# Patient Record
Sex: FEMALE | Race: OTHER
[De-identification: names, ages, dates, MRNs, and addresses within clinical notes are randomized per-mention and may not be internally consistent; named-entity substitution may affect disease eponyms.]

---

## 2018-03-22 ENCOUNTER — HOSPITAL ENCOUNTER (OUTPATIENT)
Dept: HOSPITAL 8 - RAD | Age: 23
Discharge: HOME | End: 2018-03-22
Attending: NURSE PRACTITIONER
Payer: MEDICAID

## 2018-03-22 DIAGNOSIS — R10.2: Primary | ICD-10-CM

## 2018-03-22 PROCEDURE — 76830 TRANSVAGINAL US NON-OB: CPT

## 2018-03-25 ENCOUNTER — HOSPITAL ENCOUNTER (EMERGENCY)
Dept: HOSPITAL 8 - ED | Age: 23
Discharge: HOME | End: 2018-03-25
Payer: MEDICAID

## 2018-03-25 VITALS — BODY MASS INDEX: 38.61 KG/M2 | WEIGHT: 196.65 LBS | HEIGHT: 60 IN

## 2018-03-25 VITALS — SYSTOLIC BLOOD PRESSURE: 106 MMHG | DIASTOLIC BLOOD PRESSURE: 58 MMHG

## 2018-03-25 DIAGNOSIS — N73.0: Primary | ICD-10-CM

## 2018-03-25 LAB
ALBUMIN SERPL-MCNC: 4 G/DL (ref 3.4–5)
ALP SERPL-CCNC: 86 U/L (ref 45–117)
ALT SERPL-CCNC: 39 U/L (ref 12–78)
ANION GAP SERPL CALC-SCNC: 5 MMOL/L (ref 5–15)
BASOPHILS # BLD AUTO: 0.04 X10^3/UL (ref 0–0.3)
BASOPHILS NFR BLD AUTO: 0 % (ref 0–1)
BILIRUB SERPL-MCNC: 0.3 MG/DL (ref 0.2–1)
CALCIUM SERPL-MCNC: 8.9 MG/DL (ref 8.5–10.1)
CHLORIDE SERPL-SCNC: 106 MMOL/L (ref 98–107)
CLUE CELLS: (no result)
CREAT SERPL-MCNC: 0.63 MG/DL (ref 0.55–1.02)
CULTURE INDICATED?: NO
EOSINOPHIL # BLD AUTO: 0.15 X10^3/UL (ref 0–0.8)
EOSINOPHIL NFR BLD AUTO: 2 % (ref 1–7)
ERYTHROCYTE [DISTWIDTH] IN BLOOD BY AUTOMATED COUNT: 12.6 % (ref 9.6–15.2)
LYMPHOCYTES # BLD AUTO: 2.45 X10^3/UL (ref 1–6.1)
LYMPHOCYTES NFR BLD AUTO: 27 % (ref 22–44)
MCH RBC QN AUTO: 30.9 PG (ref 27–34.8)
MCHC RBC AUTO-ENTMCNC: 33.9 G/DL (ref 32.4–35.8)
MCV RBC AUTO: 91.3 FL (ref 80–100)
MD: NO
MICROSCOPIC: (no result)
MONOCYTES # BLD AUTO: 0.65 X10^3/UL (ref 0–1.4)
MONOCYTES NFR BLD AUTO: 7 % (ref 2–9)
NEUTROPHILS # BLD AUTO: 5.79 X10^3/UL (ref 1.8–8)
NEUTROPHILS NFR BLD AUTO: 64 % (ref 42–75)
PLATELET # BLD AUTO: 274 X10^3/UL (ref 130–400)
PMV BLD AUTO: 10.4 FL (ref 7.4–10.4)
PROT SERPL-MCNC: 8.1 G/DL (ref 6.4–8.2)
RBC # BLD AUTO: 4.46 X10^6/UL (ref 3.82–5.3)
T VAGINALIS RRNA GENITAL QL PROBE: (no result)
WET PREP WBCS: (no result)

## 2018-03-25 PROCEDURE — 99285 EMERGENCY DEPT VISIT HI MDM: CPT

## 2018-03-25 PROCEDURE — 87591 N.GONORRHOEAE DNA AMP PROB: CPT

## 2018-03-25 PROCEDURE — 84703 CHORIONIC GONADOTROPIN ASSAY: CPT

## 2018-03-25 PROCEDURE — 36415 COLL VENOUS BLD VENIPUNCTURE: CPT

## 2018-03-25 PROCEDURE — 85025 COMPLETE CBC W/AUTO DIFF WBC: CPT

## 2018-03-25 PROCEDURE — 74177 CT ABD & PELVIS W/CONTRAST: CPT

## 2018-03-25 PROCEDURE — 81003 URINALYSIS AUTO W/O SCOPE: CPT

## 2018-03-25 PROCEDURE — 80053 COMPREHEN METABOLIC PANEL: CPT

## 2018-03-25 PROCEDURE — 87491 CHLMYD TRACH DNA AMP PROBE: CPT

## 2018-03-25 PROCEDURE — 87210 SMEAR WET MOUNT SALINE/INK: CPT

## 2018-03-25 PROCEDURE — 96372 THER/PROPH/DIAG INJ SC/IM: CPT

## 2018-03-25 PROCEDURE — 87808 TRICHOMONAS ASSAY W/OPTIC: CPT

## 2018-07-25 LAB
C TRACH DNA GENITAL QL NAA+PROBE: NEGATIVE
N GONORRHOEA DNA GENITAL QL NAA+PROBE: NEGATIVE

## 2018-07-26 LAB
ABO GROUP BLD: NORMAL
BLD GP AB SCN SERPL QL: NORMAL
HBV SURFACE AG SERPL QL IA: NEGATIVE
RH BLD: NORMAL
RUBV IGG SERPL IA-ACNC: NORMAL
RUBV IGG SERPL IA-ACNC: NORMAL

## 2018-12-16 ENCOUNTER — HOSPITAL ENCOUNTER (OUTPATIENT)
Facility: MEDICAL CENTER | Age: 23
End: 2018-12-16
Attending: OBSTETRICS & GYNECOLOGY | Admitting: OBSTETRICS & GYNECOLOGY
Payer: MEDICAID

## 2018-12-16 VITALS
DIASTOLIC BLOOD PRESSURE: 58 MMHG | BODY MASS INDEX: 48.6 KG/M2 | HEART RATE: 83 BPM | TEMPERATURE: 98.4 F | HEIGHT: 55 IN | SYSTOLIC BLOOD PRESSURE: 107 MMHG | WEIGHT: 210 LBS

## 2018-12-16 PROCEDURE — 59025 FETAL NON-STRESS TEST: CPT

## 2018-12-16 PROCEDURE — 81002 URINALYSIS NONAUTO W/O SCOPE: CPT

## 2018-12-16 NOTE — PROGRESS NOTES
"Pt of Dr. Razo , 31.5 weeks arrives with c/o of cramping that started this past Tuesday and has intensified. Cramping occurring in 30 minute intervals. Describes her abdomen as feeling \"tight\" all day. Describes pain as 6/10 at its worst. Reports +FM,  Denies LOF and denies VB. H(x):  labor with last pregnancy.   1500- Pt arrives to [facility] ambulatory with FOB. Pt given orders to collect urine sample and then change into hospital gown. VSS. TOCO and EFM applied.   1625- Dr. Razo telephoned and given report. Orders received to have pt discharged. Pt and FOB educated on reasons to return and be evaluated ie. VB, frequent contractions, LOF, decreased FM, etc. Questions answered at this time. FOB and pt verbalize understanding. Pt leaves unit ambulatory.     "

## 2018-12-17 LAB
APPEARANCE UR: ABNORMAL
COLOR UR AUTO: YELLOW
GLUCOSE UR QL STRIP.AUTO: NEGATIVE MG/DL
KETONES UR QL STRIP.AUTO: NEGATIVE MG/DL
LEUKOCYTE ESTERASE UR QL STRIP.AUTO: NEGATIVE
NITRITE UR QL STRIP.AUTO: NEGATIVE
PH UR STRIP.AUTO: 6.5 [PH]
PROT UR QL STRIP: 30 MG/DL
RBC UR QL AUTO: NEGATIVE
SP GR UR: 1.02

## 2018-12-18 ENCOUNTER — HOSPITAL ENCOUNTER (OUTPATIENT)
Facility: MEDICAL CENTER | Age: 23
End: 2018-12-18
Attending: OBSTETRICS & GYNECOLOGY | Admitting: OBSTETRICS & GYNECOLOGY
Payer: MEDICAID

## 2018-12-18 VITALS
HEART RATE: 88 BPM | BODY MASS INDEX: 41.62 KG/M2 | HEIGHT: 60 IN | DIASTOLIC BLOOD PRESSURE: 63 MMHG | WEIGHT: 212 LBS | RESPIRATION RATE: 20 BRPM | SYSTOLIC BLOOD PRESSURE: 124 MMHG | TEMPERATURE: 97.7 F

## 2018-12-18 PROCEDURE — 81002 URINALYSIS NONAUTO W/O SCOPE: CPT

## 2018-12-18 PROCEDURE — 59025 FETAL NON-STRESS TEST: CPT

## 2018-12-18 PROCEDURE — 700111 HCHG RX REV CODE 636 W/ 250 OVERRIDE (IP): Performed by: OBSTETRICS & GYNECOLOGY

## 2018-12-18 RX ORDER — TERBUTALINE SULFATE 1 MG/ML
0.25 INJECTION, SOLUTION SUBCUTANEOUS ONCE
Status: COMPLETED | OUTPATIENT
Start: 2018-12-18 | End: 2018-12-18

## 2018-12-18 RX ADMIN — TERBUTALINE SULFATE 0.25 MG: 1 INJECTION, SOLUTION SUBCUTANEOUS at 18:17

## 2018-12-18 ASSESSMENT — PAIN SCALES - GENERAL: PAINLEVEL_OUTOF10: 0

## 2018-12-19 LAB
APPEARANCE UR: ABNORMAL
COLOR UR AUTO: ABNORMAL
GLUCOSE UR QL STRIP.AUTO: NEGATIVE MG/DL
KETONES UR QL STRIP.AUTO: NEGATIVE MG/DL
LEUKOCYTE ESTERASE UR QL STRIP.AUTO: NEGATIVE
NITRITE UR QL STRIP.AUTO: NEGATIVE
PH UR STRIP.AUTO: 6.5 [PH]
PROT UR QL STRIP: NEGATIVE MG/DL
RBC UR QL AUTO: NEGATIVE
SP GR UR: 1.01

## 2018-12-19 NOTE — PROGRESS NOTES
Report received, pt care assumed   Dr. Metcalf notified pt denies u/c's or cramping at this time and is ready to go home. May d/c pt per Dr. Metcalf, follow up next week.    Pt given written and verbal discharge instructions to include  warning signs and [facility] instructions, pt verbalized understanding.    Pt ambulated out of triage in stable condition with significant other and child at side.

## 2018-12-19 NOTE — PROGRESS NOTES
Pt states cont to feel discomfort talked to pt about giving terbutaline to help stop the pain. Pt states understanding,Dr. Luna gave the order to and then watch pt until she feels better. FOB and pts son in room now with pt . Will cont to obs . Report given to Latrice rodriguez RN at 1900. Pt care cont.

## 2018-12-19 NOTE — PROGRESS NOTES
with EDC of 2/ making her 32.0 presents from office for UCs. Denies feeling UCs, LOF, or VB; reports good FM. Urine dipped. VM left for Dr. Luna  1705: Update given to Dr. Luna; orders to adjust toco and if she is nella, give terb. Otherwise pt can go home when she feels comfortable  1715: Report given to Sangeetha LARA

## 2019-01-10 LAB — STREP GP B DNA PCR: POSITIVE

## 2019-02-10 ENCOUNTER — HOSPITAL ENCOUNTER (OUTPATIENT)
Facility: MEDICAL CENTER | Age: 24
End: 2019-02-10
Attending: OBSTETRICS & GYNECOLOGY | Admitting: OBSTETRICS & GYNECOLOGY
Payer: MEDICAID

## 2019-02-10 VITALS
BODY MASS INDEX: 42.6 KG/M2 | WEIGHT: 217 LBS | TEMPERATURE: 97.9 F | DIASTOLIC BLOOD PRESSURE: 75 MMHG | HEART RATE: 91 BPM | HEIGHT: 60 IN | SYSTOLIC BLOOD PRESSURE: 130 MMHG

## 2019-02-10 PROCEDURE — 59025 FETAL NON-STRESS TEST: CPT

## 2019-02-10 NOTE — PROGRESS NOTES
0705 - 24 y/o  EDC 2019, EGA 39.5, here to LDA 4 with  Jr. C/O UC's that are Q4 min and have been increasing in intensity throughout the morning. EFM/TOCO applied, Patient states positive FM. Denies vaginal LOF or Bleeding.  0720 - SVE - 3/80/-1.   0755 - Reactive NST obtained, pt up ambulating in hallway.  0915 - Pt back from walking, SVE- 3/80/-1.   0925 - Call placed to Dr Mckeon, updates given. Orders received to d/c pt home.   0940 - Discussed s/s of labor, decrease FM, kick counts. Lof/bleeding and when to return back to LND. Pt verbalizes understanding and ambulated off unit, stable on feet.

## 2019-02-11 ENCOUNTER — HOSPITAL ENCOUNTER (INPATIENT)
Facility: MEDICAL CENTER | Age: 24
LOS: 2 days | End: 2019-02-13
Attending: OBSTETRICS & GYNECOLOGY | Admitting: OBSTETRICS & GYNECOLOGY
Payer: MEDICAID

## 2019-02-11 ENCOUNTER — APPOINTMENT (OUTPATIENT)
Dept: OBGYN | Facility: MEDICAL CENTER | Age: 24
End: 2019-02-11
Attending: OBSTETRICS & GYNECOLOGY
Payer: MEDICAID

## 2019-02-11 LAB
BASOPHILS # BLD AUTO: 0.3 % (ref 0–1.8)
BASOPHILS # BLD: 0.03 K/UL (ref 0–0.12)
EOSINOPHIL # BLD AUTO: 0.07 K/UL (ref 0–0.51)
EOSINOPHIL NFR BLD: 0.7 % (ref 0–6.9)
ERYTHROCYTE [DISTWIDTH] IN BLOOD BY AUTOMATED COUNT: 43.4 FL (ref 35.9–50)
HCT VFR BLD AUTO: 35.9 % (ref 37–47)
HGB BLD-MCNC: 12 G/DL (ref 12–16)
HOLDING TUBE BB 8507: NORMAL
IMM GRANULOCYTES # BLD AUTO: 0.05 K/UL (ref 0–0.11)
IMM GRANULOCYTES NFR BLD AUTO: 0.5 % (ref 0–0.9)
LYMPHOCYTES # BLD AUTO: 1.98 K/UL (ref 1–4.8)
LYMPHOCYTES NFR BLD: 20.1 % (ref 22–41)
MCH RBC QN AUTO: 30.9 PG (ref 27–33)
MCHC RBC AUTO-ENTMCNC: 33.4 G/DL (ref 33.6–35)
MCV RBC AUTO: 92.5 FL (ref 81.4–97.8)
MONOCYTES # BLD AUTO: 0.68 K/UL (ref 0–0.85)
MONOCYTES NFR BLD AUTO: 6.9 % (ref 0–13.4)
NEUTROPHILS # BLD AUTO: 7.05 K/UL (ref 2–7.15)
NEUTROPHILS NFR BLD: 71.5 % (ref 44–72)
NRBC # BLD AUTO: 0 K/UL
NRBC BLD-RTO: 0 /100 WBC
PLATELET # BLD AUTO: 255 K/UL (ref 164–446)
PMV BLD AUTO: 11.1 FL (ref 9–12.9)
RBC # BLD AUTO: 3.88 M/UL (ref 4.2–5.4)
WBC # BLD AUTO: 9.9 K/UL (ref 4.8–10.8)

## 2019-02-11 PROCEDURE — 770002 HCHG ROOM/CARE - OB PRIVATE (112)

## 2019-02-11 PROCEDURE — 10907ZC DRAINAGE OF AMNIOTIC FLUID, THERAPEUTIC FROM PRODUCTS OF CONCEPTION, VIA NATURAL OR ARTIFICIAL OPENING: ICD-10-PCS | Performed by: OBSTETRICS & GYNECOLOGY

## 2019-02-11 PROCEDURE — 0HQ9XZZ REPAIR PERINEUM SKIN, EXTERNAL APPROACH: ICD-10-PCS | Performed by: OBSTETRICS & GYNECOLOGY

## 2019-02-11 PROCEDURE — A9270 NON-COVERED ITEM OR SERVICE: HCPCS | Performed by: OBSTETRICS & GYNECOLOGY

## 2019-02-11 PROCEDURE — 304965 HCHG RECOVERY SERVICES

## 2019-02-11 PROCEDURE — 700111 HCHG RX REV CODE 636 W/ 250 OVERRIDE (IP): Performed by: OBSTETRICS & GYNECOLOGY

## 2019-02-11 PROCEDURE — 85025 COMPLETE CBC W/AUTO DIFF WBC: CPT

## 2019-02-11 PROCEDURE — 700102 HCHG RX REV CODE 250 W/ 637 OVERRIDE(OP): Performed by: OBSTETRICS & GYNECOLOGY

## 2019-02-11 PROCEDURE — 700105 HCHG RX REV CODE 258

## 2019-02-11 PROCEDURE — 700111 HCHG RX REV CODE 636 W/ 250 OVERRIDE (IP)

## 2019-02-11 PROCEDURE — 59409 OBSTETRICAL CARE: CPT

## 2019-02-11 PROCEDURE — 700105 HCHG RX REV CODE 258: Performed by: OBSTETRICS & GYNECOLOGY

## 2019-02-11 PROCEDURE — 36415 COLL VENOUS BLD VENIPUNCTURE: CPT

## 2019-02-11 RX ORDER — ROPIVACAINE HYDROCHLORIDE 2 MG/ML
INJECTION, SOLUTION EPIDURAL; INFILTRATION; PERINEURAL
Status: COMPLETED
Start: 2019-02-11 | End: 2019-02-11

## 2019-02-11 RX ORDER — DOCUSATE SODIUM 100 MG/1
100 CAPSULE, LIQUID FILLED ORAL 2 TIMES DAILY PRN
Status: DISCONTINUED | OUTPATIENT
Start: 2019-02-11 | End: 2019-02-13 | Stop reason: HOSPADM

## 2019-02-11 RX ORDER — SODIUM CHLORIDE, SODIUM LACTATE, POTASSIUM CHLORIDE, CALCIUM CHLORIDE 600; 310; 30; 20 MG/100ML; MG/100ML; MG/100ML; MG/100ML
INJECTION, SOLUTION INTRAVENOUS CONTINUOUS
Status: DISCONTINUED | OUTPATIENT
Start: 2019-02-11 | End: 2019-02-13 | Stop reason: HOSPADM

## 2019-02-11 RX ORDER — HYDROCODONE BITARTRATE AND ACETAMINOPHEN 10; 325 MG/1; MG/1
1 TABLET ORAL EVERY 4 HOURS PRN
Status: DISCONTINUED | OUTPATIENT
Start: 2019-02-11 | End: 2019-02-13 | Stop reason: HOSPADM

## 2019-02-11 RX ORDER — HYDROCODONE BITARTRATE AND ACETAMINOPHEN 5; 325 MG/1; MG/1
1 TABLET ORAL EVERY 4 HOURS PRN
Status: DISCONTINUED | OUTPATIENT
Start: 2019-02-11 | End: 2019-02-13 | Stop reason: HOSPADM

## 2019-02-11 RX ORDER — SODIUM CHLORIDE, SODIUM LACTATE, POTASSIUM CHLORIDE, CALCIUM CHLORIDE 600; 310; 30; 20 MG/100ML; MG/100ML; MG/100ML; MG/100ML
INJECTION, SOLUTION INTRAVENOUS
Status: COMPLETED
Start: 2019-02-11 | End: 2019-02-11

## 2019-02-11 RX ORDER — MISOPROSTOL 200 UG/1
800 TABLET ORAL
Status: DISCONTINUED | OUTPATIENT
Start: 2019-02-11 | End: 2019-02-11 | Stop reason: HOSPADM

## 2019-02-11 RX ORDER — ACETAMINOPHEN 325 MG/1
325 TABLET ORAL EVERY 4 HOURS PRN
Status: DISCONTINUED | OUTPATIENT
Start: 2019-02-11 | End: 2019-02-13 | Stop reason: HOSPADM

## 2019-02-11 RX ORDER — IBUPROFEN 600 MG/1
600 TABLET ORAL EVERY 6 HOURS PRN
Status: DISCONTINUED | OUTPATIENT
Start: 2019-02-11 | End: 2019-02-13 | Stop reason: HOSPADM

## 2019-02-11 RX ORDER — LIDOCAINE HYDROCHLORIDE 10 MG/ML
INJECTION, SOLUTION INFILTRATION; PERINEURAL
Status: COMPLETED
Start: 2019-02-11 | End: 2019-02-11

## 2019-02-11 RX ORDER — SODIUM CHLORIDE, SODIUM LACTATE, POTASSIUM CHLORIDE, AND CALCIUM CHLORIDE .6; .31; .03; .02 G/100ML; G/100ML; G/100ML; G/100ML
250 INJECTION, SOLUTION INTRAVENOUS PRN
Status: CANCELLED | OUTPATIENT
Start: 2019-02-11

## 2019-02-11 RX ORDER — SODIUM CHLORIDE, SODIUM LACTATE, POTASSIUM CHLORIDE, CALCIUM CHLORIDE 600; 310; 30; 20 MG/100ML; MG/100ML; MG/100ML; MG/100ML
INJECTION, SOLUTION INTRAVENOUS PRN
Status: DISCONTINUED | OUTPATIENT
Start: 2019-02-11 | End: 2019-02-13 | Stop reason: HOSPADM

## 2019-02-11 RX ORDER — ROPIVACAINE HYDROCHLORIDE 2 MG/ML
INJECTION, SOLUTION EPIDURAL; INFILTRATION; PERINEURAL CONTINUOUS
Status: CANCELLED | OUTPATIENT
Start: 2019-02-11

## 2019-02-11 RX ORDER — BUPIVACAINE HYDROCHLORIDE 2.5 MG/ML
INJECTION, SOLUTION EPIDURAL; INFILTRATION; INTRACAUDAL
Status: ACTIVE
Start: 2019-02-11 | End: 2019-02-12

## 2019-02-11 RX ORDER — ONDANSETRON 4 MG/1
4 TABLET, ORALLY DISINTEGRATING ORAL EVERY 6 HOURS PRN
Status: DISCONTINUED | OUTPATIENT
Start: 2019-02-11 | End: 2019-02-13 | Stop reason: HOSPADM

## 2019-02-11 RX ORDER — LIDOCAINE HYDROCHLORIDE AND EPINEPHRINE 15; 5 MG/ML; UG/ML
INJECTION, SOLUTION EPIDURAL
Status: ACTIVE
Start: 2019-02-11 | End: 2019-02-12

## 2019-02-11 RX ORDER — SODIUM CHLORIDE, SODIUM LACTATE, POTASSIUM CHLORIDE, AND CALCIUM CHLORIDE .6; .31; .03; .02 G/100ML; G/100ML; G/100ML; G/100ML
1000 INJECTION, SOLUTION INTRAVENOUS
Status: CANCELLED | OUTPATIENT
Start: 2019-02-11

## 2019-02-11 RX ORDER — ONDANSETRON 2 MG/ML
4 INJECTION INTRAMUSCULAR; INTRAVENOUS EVERY 6 HOURS PRN
Status: DISCONTINUED | OUTPATIENT
Start: 2019-02-11 | End: 2019-02-13 | Stop reason: HOSPADM

## 2019-02-11 RX ORDER — MISOPROSTOL 200 UG/1
600 TABLET ORAL
Status: DISCONTINUED | OUTPATIENT
Start: 2019-02-11 | End: 2019-02-13 | Stop reason: HOSPADM

## 2019-02-11 RX ADMIN — AMPICILLIN SODIUM 1 G: 1 INJECTION, POWDER, FOR SOLUTION INTRAMUSCULAR; INTRAVENOUS at 15:01

## 2019-02-11 RX ADMIN — OXYTOCIN 125 ML/HR: 10 INJECTION, SOLUTION INTRAMUSCULAR; INTRAVENOUS at 21:36

## 2019-02-11 RX ADMIN — SODIUM CHLORIDE, SODIUM LACTATE, POTASSIUM CHLORIDE, CALCIUM CHLORIDE: 600; 310; 30; 20 INJECTION, SOLUTION INTRAVENOUS at 15:00

## 2019-02-11 RX ADMIN — OXYTOCIN 2 MILLI-UNITS/MIN: 10 INJECTION, SOLUTION INTRAMUSCULAR; INTRAVENOUS at 11:00

## 2019-02-11 RX ADMIN — AMPICILLIN SODIUM 2 G: 2 INJECTION, POWDER, FOR SOLUTION INTRAMUSCULAR; INTRAVENOUS at 10:55

## 2019-02-11 RX ADMIN — ROPIVACAINE HYDROCHLORIDE 100 ML: 2 INJECTION, SOLUTION EPIDURAL; INFILTRATION at 14:45

## 2019-02-11 RX ADMIN — FENTANYL CITRATE 100 MCG: 50 INJECTION, SOLUTION INTRAMUSCULAR; INTRAVENOUS at 13:24

## 2019-02-11 RX ADMIN — FENTANYL CITRATE 100 MCG: 50 INJECTION, SOLUTION INTRAMUSCULAR; INTRAVENOUS at 14:26

## 2019-02-11 RX ADMIN — SODIUM CHLORIDE, SODIUM LACTATE, POTASSIUM CHLORIDE, CALCIUM CHLORIDE 1000 ML: 600; 310; 30; 20 INJECTION, SOLUTION INTRAVENOUS at 10:55

## 2019-02-11 RX ADMIN — SODIUM CHLORIDE, POTASSIUM CHLORIDE, SODIUM LACTATE AND CALCIUM CHLORIDE: 600; 310; 30; 20 INJECTION, SOLUTION INTRAVENOUS at 15:00

## 2019-02-11 RX ADMIN — AMPICILLIN SODIUM 1 G: 1 INJECTION, POWDER, FOR SOLUTION INTRAMUSCULAR; INTRAVENOUS at 19:05

## 2019-02-11 RX ADMIN — SODIUM CHLORIDE, POTASSIUM CHLORIDE, SODIUM LACTATE AND CALCIUM CHLORIDE 1000 ML: 600; 310; 30; 20 INJECTION, SOLUTION INTRAVENOUS at 10:55

## 2019-02-11 RX ADMIN — IBUPROFEN 600 MG: 600 TABLET ORAL at 21:33

## 2019-02-11 RX ADMIN — OXYTOCIN 2000 ML/HR: 10 INJECTION, SOLUTION INTRAMUSCULAR; INTRAVENOUS at 20:56

## 2019-02-11 ASSESSMENT — PATIENT HEALTH QUESTIONNAIRE - PHQ9
2. FEELING DOWN, DEPRESSED, IRRITABLE, OR HOPELESS: NOT AT ALL
SUM OF ALL RESPONSES TO PHQ9 QUESTIONS 1 AND 2: 0
2. FEELING DOWN, DEPRESSED, IRRITABLE, OR HOPELESS: NOT AT ALL
1. LITTLE INTEREST OR PLEASURE IN DOING THINGS: NOT AT ALL
2. FEELING DOWN, DEPRESSED, IRRITABLE, OR HOPELESS: NOT AT ALL
1. LITTLE INTEREST OR PLEASURE IN DOING THINGS: NOT AT ALL
1. LITTLE INTEREST OR PLEASURE IN DOING THINGS: NOT AT ALL

## 2019-02-11 ASSESSMENT — LIFESTYLE VARIABLES
EVER_SMOKED: NEVER
ALCOHOL_USE: NO

## 2019-02-11 NOTE — H&P
History and Physical      Slime Beebe is a 23 y.o. female  at 39w6d who presents for  IOL.    Subjective:   positive  For CTXS.   negative Feels pain   negative for LOF  negative for vaginal bleeding.   positive for fetal movement    ROS: Constitutional: negative  Respiratory: negative  Cardiovascular: negative  Gastrointestinal: negative  Genitourinary:negative    Past Medical History:   Diagnosis Date   • Ulcer     Pt states this has resolved      No past surgical history on file.  OB History    Para Term  AB Living   2 1 1     1   SAB TAB Ectopic Molar Multiple Live Births             1      # Outcome Date GA Lbr Bhupinder/2nd Weight Sex Delivery Anes PTL Lv   2 Current            1 Term 10/31/14 41w0d  3.487 kg (7 lb 11 oz) M Vag-Spont None N CHARLIE      Birth Comments: No complications        Social History     Social History   • Marital status: Single     Spouse name: N/A   • Number of children: N/A   • Years of education: N/A     Occupational History   • Not on file.     Social History Main Topics   • Smoking status: Never Smoker   • Smokeless tobacco: Never Used   • Alcohol use No   • Drug use: No   • Sexual activity: Yes     Partners: Male      Comment: none     Other Topics Concern   • Not on file     Social History Narrative   • No narrative on file     Allergies: Latex  No current facility-administered medications for this encounter.     Prenatal care with  starting at first trimester. with following problems:  There are no active problems to display for this patient.              Objective:      Blood pressure 116/76, pulse (!) 104, temperature 37 °C (98.6 °F), temperature source Temporal, resp. rate 16, height 1.524 m (5'), weight 98.4 kg (217 lb).    General:   no acute distress, alert, cooperative   Skin:   normal   HEENT:  PERRLA   Lungs:   CTA bilateral   Heart:   S1, S2 normal, no murmur, click, rub or gallop, regular rate and rhythm, brisk carotid upstroke  without bruits, peripheral pulses very brisk, chest is clear without rales or wheezing, no pedal edema, no JVD, no hepatosplenomegaly   Abdomen:   gravid, NT   EFW:  3600gms.   Pelvis:  adequate with gynecoid pelvis   FHT:  146 BPM   Uterine Size: S=D   Presentations: Cephalic   Cervix:     Dilation: 3cm    Effacement: 75%    Station:  -3    Consistency: Soft    Position: Middle     Lab Review  Lab:   Blood type: A     Recent Results (from the past 5880 hour(s))   CHLAMYDIA DNA PROBE    Collection Time: 07/25/18 12:00 AM   Result Value Ref Range    C. trachomatis by PCR negative    NEISSERIA GONORRHOEAE CONFIRM BY TMA    Collection Time: 07/25/18 12:00 AM   Result Value Ref Range    N. gonorrhoeae by PCR negative    RUBELLA    Collection Time: 07/26/18 12:00 AM   Result Value Ref Range    Rubella IgG Antibody immune    HEPATITIS B SURFACE ANTIGEN    Collection Time: 07/26/18 12:00 AM   Result Value Ref Range    Hepatitis B Surface Antigen negative    OP PRENATAL PANEL-BLOOD BANK    Collection Time: 07/26/18 12:00 AM   Result Value Ref Range    Rh Grouping Only POS     ABO Grouping Only A     Antibody Screen Scrn NEG    RUBELLA    Collection Time: 07/26/18 12:00 AM   Result Value Ref Range    Rubella IgG Antibody immune    POC UA    Collection Time: 12/16/18  3:45 PM   Result Value Ref Range    POC Color Yellow     POC Appearance Cloudy (A)     POC Glucose Negative Negative mg/dL    POC Ketones Negative Negative mg/dL    POC Specific Gravity 1.020 1.005 - 1.030    POC Blood Negative Negative    POC Urine PH 6.5 5.0 - 8.0    POC Protein 30 (A) Negative mg/dL    POC Nitrites Negative Negative    POC Leukocyte Esterase Negative Negative   POC UA    Collection Time: 12/18/18  4:33 PM   Result Value Ref Range    POC Color Kimberlee     POC Appearance Slightly Cloudy (A)     POC Glucose Negative Negative mg/dL    POC Ketones Negative Negative mg/dL    POC Specific Gravity 1.015 1.005 - 1.030    POC Blood Negative Negative     POC Urine PH 6.5 5.0 - 8.0    POC Protein Negative Negative mg/dL    POC Nitrites Negative Negative    POC Leukocyte Esterase Negative Negative   GRP B STREP, BY PCR (HUNTER BROTH)    Collection Time: 01/10/19 12:00 AM   Result Value Ref Range    Strep Gp B DNA PCR Positive         Assessment:   Slime Beebe at 39w6d  Labor status: Early latent labor.  Obstetrical history significant for There are no active problems to display for this patient.  .      Plan:     Admit to L&D  GBS positive.   ad,mission   ampicillin for GBS pro-phylaxis.  Pitocin augmentation.

## 2019-02-11 NOTE — PROGRESS NOTES
EDC -  EGA - 39.6    0921 - Pt arrived to labor and delivery for scheduled IOL. Pt placed in room S220.  0955 - External monitors in place X2. Category I FHT at this time. VSS. Pt reports good FM. No complaints of contractions, ROM or vaginal bleeding.Pt reports only some occasional mild cramping. SVE 3-4/80/-2. FOB at bedside. POC discussed with pt and family members, all questions answered.   1010 - Dr. Luna updated. Orders received.   1130 - Dr. Luna at bedside. SVE unchanged. AROM, clear fluid. IUPC placed by provider.   1245 - Pt on birthing ball  1320 - Pt requesting IV pain medication (see MAR)  1420 - SVE 5-6/80/-2  1440 - Dr. Mendoza at bedside to place epidural.   1453 - Test muller given, pt tolerated well  1600 - Soria placed, pt tolerated well. SVE 6/90/-2.   1845 - SVE 8/90/-2. Pt turned to right side, peanut ball in place.    - Report to ADARSH Avila, RN

## 2019-02-12 LAB
ERYTHROCYTE [DISTWIDTH] IN BLOOD BY AUTOMATED COUNT: 44.8 FL (ref 35.9–50)
HCT VFR BLD AUTO: 28.9 % (ref 37–47)
HGB BLD-MCNC: 9.6 G/DL (ref 12–16)
MCH RBC QN AUTO: 31.5 PG (ref 27–33)
MCHC RBC AUTO-ENTMCNC: 33.2 G/DL (ref 33.6–35)
MCV RBC AUTO: 94.8 FL (ref 81.4–97.8)
PLATELET # BLD AUTO: 221 K/UL (ref 164–446)
PMV BLD AUTO: 11.2 FL (ref 9–12.9)
RBC # BLD AUTO: 3.05 M/UL (ref 4.2–5.4)
WBC # BLD AUTO: 16.7 K/UL (ref 4.8–10.8)

## 2019-02-12 PROCEDURE — A9270 NON-COVERED ITEM OR SERVICE: HCPCS | Performed by: OBSTETRICS & GYNECOLOGY

## 2019-02-12 PROCEDURE — 700112 HCHG RX REV CODE 229: Performed by: OBSTETRICS & GYNECOLOGY

## 2019-02-12 PROCEDURE — 303615 HCHG EPIDURAL/SPINAL ANESTHESIA FOR LABOR

## 2019-02-12 PROCEDURE — 85027 COMPLETE CBC AUTOMATED: CPT

## 2019-02-12 PROCEDURE — 770002 HCHG ROOM/CARE - OB PRIVATE (112)

## 2019-02-12 PROCEDURE — 700102 HCHG RX REV CODE 250 W/ 637 OVERRIDE(OP): Performed by: OBSTETRICS & GYNECOLOGY

## 2019-02-12 PROCEDURE — 36415 COLL VENOUS BLD VENIPUNCTURE: CPT

## 2019-02-12 RX ADMIN — DOCUSATE SODIUM 100 MG: 100 CAPSULE, LIQUID FILLED ORAL at 22:17

## 2019-02-12 RX ADMIN — IBUPROFEN 600 MG: 600 TABLET ORAL at 04:42

## 2019-02-12 RX ADMIN — IBUPROFEN 600 MG: 600 TABLET ORAL at 22:17

## 2019-02-12 RX ADMIN — IBUPROFEN 600 MG: 600 TABLET ORAL at 13:39

## 2019-02-12 ASSESSMENT — EDINBURGH POSTNATAL DEPRESSION SCALE (EPDS)
I HAVE BEEN ANXIOUS OR WORRIED FOR NO GOOD REASON: YES, SOMETIMES
I HAVE BEEN ABLE TO LAUGH AND SEE THE FUNNY SIDE OF THINGS: AS MUCH AS I ALWAYS COULD
I HAVE FELT SAD OR MISERABLE: NO, NOT AT ALL
THE THOUGHT OF HARMING MYSELF HAS OCCURRED TO ME: NEVER
THINGS HAVE BEEN GETTING ON TOP OF ME: NO, MOST OF THE TIME I HAVE COPED QUITE WELL
I HAVE FELT SCARED OR PANICKY FOR NO GOOD REASON: YES, SOMETIMES
I HAVE BLAMED MYSELF UNNECESSARILY WHEN THINGS WENT WRONG: NOT VERY OFTEN
I HAVE BEEN SO UNHAPPY THAT I HAVE HAD DIFFICULTY SLEEPING: NOT AT ALL
I HAVE LOOKED FORWARD WITH ENJOYMENT TO THINGS: AS MUCH AS I EVER DID
I HAVE BEEN SO UNHAPPY THAT I HAVE BEEN CRYING: NO, NEVER

## 2019-02-12 ASSESSMENT — PATIENT HEALTH QUESTIONNAIRE - PHQ9
2. FEELING DOWN, DEPRESSED, IRRITABLE, OR HOPELESS: NOT AT ALL
SUM OF ALL RESPONSES TO PHQ9 QUESTIONS 1 AND 2: 0
1. LITTLE INTEREST OR PLEASURE IN DOING THINGS: NOT AT ALL

## 2019-02-12 NOTE — CARE PLAN
Problem: Pain Management  Goal: Pain level will decrease to patient's comfort goal    Intervention: Follow pain managment plan developed in collaboration with patient and Interdisciplinary Team  Pt educated on pain management methods, pt to notify RN when ready for pain medication

## 2019-02-12 NOTE — CARE PLAN
Problem: Pain  Goal: Alleviation of Pain or a reduction in pain to the patient's comfort goal  Outcome: PROGRESSING AS EXPECTED  Pt resting comfortably with epidural in place    Problem: Risk for Infection, Impaired Wound Healing  Goal: Remain free from signs and symptoms of infection  Outcome: PROGRESSING AS EXPECTED  No s/s of infection noted pt remains afebrile

## 2019-02-12 NOTE — PROGRESS NOTES
0700 - Report received from SAMY Baltazar RN. POC discussed, care assumed.   0815 -  Full Assessment complete. Fundus firm one fingerbreadth below umbilicus. Scant lochia at this time. Pt instructed on how to perform fundal massage and reasoning for. Pt states understanding.  POC discussed with pt and family members, all questions answered.   0845 - infant brought to room from nursery.

## 2019-02-12 NOTE — CARE PLAN
Problem: Altered physiologic condition related to immediate post-delivery state and potential for bleeding/hemorrhage  Goal: Patient physiologically stable as evidenced by normal lochia, palpable uterine involution and vital signs within normal limits    Intervention: Massage fundus as necessary to prevent excessive lochia  Fundus firm one fingerbreadth below umbilicus. Scant lochia at this time. Pt instructed on how to perform fundal massage and reasoning for. Pt states understanding.

## 2019-02-12 NOTE — PROGRESS NOTES
EDC - 19 EGA - 39.6     - report received from MATILDA Jackson RN. FOB at bedside. POC discussed with pt and family members, all questions answered. Pt resting comfortably at this time, denies needs.   Dr. Luna notified of pt status, states she is on her way to the hospital now.   Pt states she feels like she needs to poop, SVE /0.     in department.   Dr. Peña at bedside SVE Complete. Orders to start pushing. Pt repositioned into stirrups, pt educated on proper pushing technique. RN continuously at bedside evaluating FHT and pushing progress.   Dr. Luna at bedside for delivery.    of viable male infant by Dr. Luna to maternal abdomen. Apgars 8/9    2100 Placenta delivered intact.     Fundus firm, lochia Light.   Pt ambulated to bathroom in stable condition at this time. Pt able to void. Nancy pads changed.   Pt transferred to  in stable condition via wheel chair with infant in arms. Report given to Ashley LARA. Infant bands matched x2 cuddles active.

## 2019-02-12 NOTE — PROGRESS NOTES
Patient arrived to room 319 at 2310. Report received from MARCO Aleman. Educated patient on cuddles system, emergency pull cord, and skylight educational videos. Call light within reach.

## 2019-02-12 NOTE — L&D DELIVERY NOTE
Delivery Note    Obstetrician:   .    Pre-Delivery Diagnosis: term.    Post-Delivery Diagnosis: Living  infant(s) or Male    Procedure: Spontaneous vaginal delivery  Came for IOL 3 80 /0-3 pitocin augmentation and AROM clear fluid.   had epidural anesthesia with contraction and maternal pushing delivered alive term baby boy in cephalic presentation.mouth and nose bulb suctioned and rest of the body delivered.     Episiotomy or Incision: first degree perineal tear.    Indications for instrumental delivery: none    Infant Wt: pending.       Apgars: 1' 8  /  5' 9     Placenta and Cord:          Mechanism: spontaneous        Description:  complete, 3 vessel cord, membranes intact    Estimated Blood Loss:  200ml.Total IV Fluids: 1500ml           Specimens: none.           Complications:  none           Condition: stable    Infant Feeding:    breast    Attending Attestation: I was present and scrubbed for the entire procedure.

## 2019-02-12 NOTE — PROGRESS NOTES
Slime Higinio Beebe PP day 1.    Subjective: Abdominal pain. yes, ambulating .yes, tolerating liquids .yes, tolerating regular diet .yes, flatus.yes, BM .yes, Bleeding .yes, voiding .yes,dizziness .no, breast feeding.yes, breast tenderness .no    Blood pressure 120/73, pulse 88, temperature 36.2 °C (97.1 °F), temperature source Temporal, resp. rate 17, height 1.524 m (5'), weight 98.4 kg (217 lb), SpO2 95 %, currently breastfeeding.  Breast Exam: Tenderness .no, Engourgement .no, Mastitis .no  Abdomen soft, non-tender. BS normal. No masses,  No organomegaly  Incision: healing well with good reapproximation  Fundus Tenderness:no, Below umbilicus:Yes,   Perineumperineum intact  Extremities2+ edema    Meds:   No current facility-administered medications on file prior to encounter.      Current Outpatient Prescriptions on File Prior to Encounter   Medication Sig Dispense Refill   • sucralfate (CARAFATE) 1 GM TABS Take 1 Tab by mouth 4 Times a Day,Before Meals and at Bedtime. 40 Tab 4   • oxycodone-acetaminophen (PERCOCET) 5-325 MG TABS Take 1 Tab by mouth every four hours as needed for Moderate Pain ((Pain Scale 4-6); If acetaminophen ineffective). 15 Tab 0   • ibuprofen (MOTRIN) 600 MG TABS Take 1 Tab by mouth every 6 hours as needed (Cramping). 30 Tab 3   • ferrous sulfate 325 (65 FE) MG tablet Take 1 Tab by mouth 3 times a day, with meals. 30 Tab 3   • docusate sodium 100 MG CAPS Take 100 mg by mouth 2 times a day as needed for Constipation. 60 Cap 3   • NIFEdipine (PROCARDIA) 10 MG CAPS Take 1 Cap by mouth every 6 hours. 120 Cap 1   • Prenatal Vit-FePoly-FA-DHA 27-1-200 MG CAPS Take  by mouth.         Lab:   Recent Results (from the past 48 hour(s))   Hold Blood Bank Specimen (Not Tested)    Collection Time: 02/11/19 10:45 AM   Result Value Ref Range    Holding Tube - Bb DONE    CBC WITH DIFFERENTIAL    Collection Time: 02/11/19 10:45 AM   Result Value Ref Range    WBC 9.9 4.8 - 10.8 K/uL    RBC 3.88 (L) 4.20 -  5.40 M/uL    Hemoglobin 12.0 12.0 - 16.0 g/dL    Hematocrit 35.9 (L) 37.0 - 47.0 %    MCV 92.5 81.4 - 97.8 fL    MCH 30.9 27.0 - 33.0 pg    MCHC 33.4 (L) 33.6 - 35.0 g/dL    RDW 43.4 35.9 - 50.0 fL    Platelet Count 255 164 - 446 K/uL    MPV 11.1 9.0 - 12.9 fL    Neutrophils-Polys 71.50 44.00 - 72.00 %    Lymphocytes 20.10 (L) 22.00 - 41.00 %    Monocytes 6.90 0.00 - 13.40 %    Eosinophils 0.70 0.00 - 6.90 %    Basophils 0.30 0.00 - 1.80 %    Immature Granulocytes 0.50 0.00 - 0.90 %    Nucleated RBC 0.00 /100 WBC    Neutrophils (Absolute) 7.05 2.00 - 7.15 K/uL    Lymphs (Absolute) 1.98 1.00 - 4.80 K/uL    Monos (Absolute) 0.68 0.00 - 0.85 K/uL    Eos (Absolute) 0.07 0.00 - 0.51 K/uL    Baso (Absolute) 0.03 0.00 - 0.12 K/uL    Immature Granulocytes (abs) 0.05 0.00 - 0.11 K/uL    NRBC (Absolute) 0.00 K/uL   CBC without differential    Collection Time: 02/12/19  5:09 AM   Result Value Ref Range    WBC 16.7 (H) 4.8 - 10.8 K/uL    RBC 3.05 (L) 4.20 - 5.40 M/uL    Hemoglobin 9.6 (L) 12.0 - 16.0 g/dL    Hematocrit 28.9 (L) 37.0 - 47.0 %    MCV 94.8 81.4 - 97.8 fL    MCH 31.5 27.0 - 33.0 pg    MCHC 33.2 (L) 33.6 - 35.0 g/dL    RDW 44.8 35.9 - 50.0 fL    Platelet Count 221 164 - 446 K/uL    MPV 11.2 9.0 - 12.9 fL       Assessment and Plan  normal postpartum course  Continue Routine postpartum care

## 2019-02-12 NOTE — CONSULTS
Lactation note:  Initial visit. MOB BF 1st x 11 months. MOB is doing skin to skin with infant. Reviewed normal  behaviors and normal course of breastfeeding at 12-24-48-72 hours, and what to expect. Able to hand express colostrum from right breast. Infant not showing feeding cues, and is sleepy at the breast. Reminded of importance of offering breast with feeding cues or at least every 2-3 hours. If infant shows no interest, can do hand expression into infant's lips. Encouraged to continue doing skin to skin. Discussed signs of a good latch, voiding and stooling patterns, feeding cues, stomach size, and importance of establishing milk supply with frequency of feedings. Getting to Know your Baby information provided.   Plan for today is to continue to offer breast first, if not latching well, can hand express colostrum, and refeed by spoon.        Encouraged her to continue to work on deep latch, and skin 2 skin, with hand expression.     [facility] [address], encouraged her to follow up with them for outpatient lactation support.     Information and phone number to the Lactation connection given, and  invited to breastfeeding circles.    Encouraged to call for support/assist as needed.

## 2019-02-13 VITALS
OXYGEN SATURATION: 97 % | DIASTOLIC BLOOD PRESSURE: 64 MMHG | HEIGHT: 60 IN | RESPIRATION RATE: 16 BRPM | BODY MASS INDEX: 42.6 KG/M2 | WEIGHT: 217 LBS | TEMPERATURE: 98.5 F | HEART RATE: 95 BPM | SYSTOLIC BLOOD PRESSURE: 99 MMHG

## 2019-02-13 PROCEDURE — A9270 NON-COVERED ITEM OR SERVICE: HCPCS | Performed by: OBSTETRICS & GYNECOLOGY

## 2019-02-13 PROCEDURE — 700102 HCHG RX REV CODE 250 W/ 637 OVERRIDE(OP): Performed by: OBSTETRICS & GYNECOLOGY

## 2019-02-13 PROCEDURE — 700112 HCHG RX REV CODE 229: Performed by: OBSTETRICS & GYNECOLOGY

## 2019-02-13 RX ORDER — IBUPROFEN 600 MG/1
600 TABLET ORAL EVERY 6 HOURS PRN
Qty: 30 TAB | Refills: 1 | Status: SHIPPED | OUTPATIENT
Start: 2019-02-13 | End: 2019-03-15

## 2019-02-13 RX ADMIN — IBUPROFEN 600 MG: 600 TABLET ORAL at 10:31

## 2019-02-13 RX ADMIN — DOCUSATE SODIUM 100 MG: 100 CAPSULE, LIQUID FILLED ORAL at 10:31

## 2019-02-13 NOTE — DISCHARGE SUMMARY
Discharge Summary:      Slime Beebe      Admit Date:   2019  Discharge Date:  2019     Admitting diagnosis:  Pregnancy  Labor and delivery, indication for care  Discharge Diagnosis: Status post vaginal, spontaneous.  Pregnancy Complications: none  Tubal Ligation:  no        History:  Past Medical History:   Diagnosis Date   • Ulcer     Pt states this has resolved      OB History    Para Term  AB Living   2 2 2     2   SAB TAB Ectopic Molar Multiple Live Births           0 2      # Outcome Date GA Lbr Bhupinder/2nd Weight Sex Delivery Anes PTL Lv   2 Term 19 39w6d / 00:54 3.565 kg (7 lb 13.8 oz) M Vag-Spont EPI N CHARLIE   1 Term 10/31/14 41w0d  3.487 kg (7 lb 11 oz) M Vag-Spont None N CHARLIE      Birth Comments: No complications           Latex  There are no active problems to display for this patient.       Hospital Course:   23 y.o. , now para 2, was admitted with the above mentioned diagnosis, underwent Active Labor, vaginal, spontaneous. Patient postpartum course was unremarkable, with progressive advancement in diet , ambulation and toleration of oral analgesia. Patient without complaints today and desires discharge.      Vitals:    19 0200 19 0600 19 1800 19 0600   BP: 102/65 120/73 102/70 (!) 99/64   Pulse: 88 88 83 95   Resp: 18 17 18 16   Temp: 36.6 °C (97.8 °F) 36.2 °C (97.1 °F) 36.6 °C (97.8 °F) 36.9 °C (98.5 °F)   TempSrc: Temporal Temporal Temporal Temporal   SpO2: 95% 95% 97% 97%   Weight:       Height:           Current Facility-Administered Medications   Medication Dose   • ondansetron (ZOFRAN ODT) dispertab 4 mg  4 mg    Or   • ondansetron (ZOFRAN) syringe/vial injection 4 mg  4 mg   • oxytocin (PITOCIN) infusion (for induction)  0.5-20 ezequiel-units/min   • oxytocin (PITOCIN) infusion (for postpartum)   mL/hr   • ibuprofen (MOTRIN) tablet 600 mg  600 mg   • acetaminophen (TYLENOL) tablet 325 mg  325 mg   •  HYDROcodone-acetaminophen (NORCO) 5-325 MG per tablet 1 Tab  1 Tab   • HYDROcodone/acetaminophen (NORCO)  MG per tablet 1 Tab  1 Tab   • lactated ringers infusion     • LR infusion     • PRN oxytocin (PITOCIN) (20 Units/1000 mL) PRN for excessive uterine bleeding - See Admin Instr  125-999 mL/hr   • miSOPROStol (CYTOTEC) tablet 600 mcg  600 mcg   • docusate sodium (COLACE) capsule 100 mg  100 mg       Exam:  Breast Exam: negative  Abdomen: Abdomen soft, non-tender. BS normal. No masses,  No organomegaly  Fundus Non Tender: yes  Perineum: perineum intact  Extremity: extremities, peripheral pulses and reflexes normal     Labs:  Recent Labs      02/11/19   1045  02/12/19   0509   WBC  9.9  16.7*   RBC  3.88*  3.05*   HEMOGLOBIN  12.0  9.6*   HEMATOCRIT  35.9*  28.9*   MCV  92.5  94.8   MCH  30.9  31.5   MCHC  33.4*  33.2*   RDW  43.4  44.8   PLATELETCT  255  221   MPV  11.1  11.2        Activity:   Discharge to home  Pelvic Rest x 6 weeks    Assessment:  normal postpartum course  Discharge Assessment: No heavy bleeding or foul vaginal discharge      [facility] up: [facility] or [facility] in 5 weeks for vaginal.      Discharge Meds:   Current Outpatient Prescriptions   Medication Sig Dispense Refill   • ibuprofen (MOTRIN) 600 MG Tab Take 1 Tab by mouth every 6 hours as needed for Mild Pain (For cramping after delivery; do not give if patient is receiving ketorolac (Toradol)) for up to 30 days. 30 Tab 1       Aj Luna M.D.

## 2019-02-13 NOTE — DISCHARGE INSTRUCTIONS
"POSTPARTUM DISCHARGE INSTRUCTIONS FOR MOM    YOB: 1995   Age: 23 y.o.               Admit Date: 2/11/2019     Discharge Date: 2/13/2019  Attending Doctor:  Aj Luna M.D.                  Allergies:  Latex    Discharged to home by car. Discharged via wheelchair, hospital escort: Yes.  Special equipment needed: Not Applicable  Belongings with: Personal  Be sure to schedule a follow-up appointment with your primary care doctor or any specialists as instructed.     Discharge Plan:   Influenza Vaccine Indication: Patient Refuses    REASONS TO CALL YOUR OBSTETRICIAN:  1.   Persistent fever or shaking chills (Temperature higher than 100.4)  2.   Heavy bleeding (soaking more than 1 pad per hour); Passing clots  3.   Foul odor from vagina  4.   Mastitis (Breast infection; breast pain, chills, fever, redness)  5.   Urinary pain, burning or frequency  6.   Episiotomy infection    8.   Severe depression longer than 24 hours    HAND WASHING  · Prior to handling the baby.  · Before breastfeeding or bottle feeding baby.  · After using the bathroom or changing the baby's diaper.          VAGINAL CARE  · Nothing inside vagina for 6 weeks: no sexual intercourse, tampons or douching.  · Bleeding may continue for 2-4 weeks.  Amount may vary.    · Call your physician for heavy bleeding which means soaking more than 1 pad per hour    BIRTH CONTROL  · It is possible to become pregnant at any time after delivery and while breastfeeding.  · Plan to discuss a method of birth control with your physician at your follow up visit. visit.    DIET AND ELIMINATION  · Eating more fiber (bran cereal, fruits, and vegetables) and drinking plenty of fluids will help to avoid constipation.  · Urinary frequency after childbirth is normal.    POSTPARTUM BLUES  During the first few days after birth, you may experience a sense of the \"blues\" which may include impatience, irritability or even crying.  These feeling come and go quickly.  " "However, as many as 1 in 10 women experience emotional symptoms known as postpartum depression.    Postpartum depression:  May start as early as the second or third day after delivery or take several weeks or months to develop.  Symptoms of \"blues\" are present, but are more intense:  Crying spells; loss of appetite; feelings of hopelessness or loss of control; fear of touching the baby; over concern or no concern at all about the baby; little or no concern about your own appearance/caring for yourself; and/or inability to sleep or excessive sleeping.  Contact your physician if you are experiencing any of these symptoms.    Crisis Hotline:  · [place] Crisis Hotline:  2-567-XQCDMWL  Or 1-254.295.6958  · [place] Crisis Hotline:  1-748.890.6667  Or 475-116-5942    PREVENTING SHAKEN BABY:  If you are angry or stressed, PUT THE BABY IN THE CRIB, step away, take some deep breaths, and wait until you are calm to care for the baby.  DO NOT SHAKE THE BABY.  You are not alone, call a supporter for help.    · Crisis Call Center 24/7 crisis line 159-388-8734 or 1-113.835.8708  · You can also text them, text \"ANSWER\" to 879139    QUIT SMOKING/TOBACCO USE:  I understand the use of any tobacco products increases my chance of suffering from future heart disease and could cause other illnesses which may shorten my life. Quitting the use of tobacco products is the single most important thing I can do to improve my health. For further information on smoking / tobacco cessation call a Toll Free Quit Line at 1-975.520.1678 (*[employer] [place]) or 1-689.644.8545 (American Lung Association) or you can access the web based program at www.lungusa.org.    · [place] Tobacco Users Help Line:  (474) 986-3606       Toll Free: 1-427.195.8986  · Quit Tobacco Program [facility], [place], operated by [facility] ([phone])633-7242    DEPRESSION / SUICIDE RISK:  As you are discharged from this [facility], [place], it is important to learn how to keep safe " from harming yourself.    Recognize the warning signs:  · Abrupt changes in personality, positive or negative- including increase in energy   · Giving away possessions  · Change in eating patterns- significant weight changes-  positive or negative  · Change in sleeping patterns- unable to sleep or sleeping all the time   · Unwillingness or inability to communicate  · Depression  · Unusual sadness, discouragement and loneliness  · Talk of wanting to die  · Neglect of personal appearance   · Rebelliousness- reckless behavior  · Withdrawal from people/activities they love  · Confusion- inability to concentrate     If you or a loved one observes any of these behaviors or has concerns about self-harm, here's what you can do:  · Talk about it- your feelings and reasons for harming yourself  · Remove any means that you might use to hurt yourself (examples: pills, rope, extension cords, firearm)  · Get professional help from the community (Mental Health, Substance Abuse, psychological counseling)  · Do not be alone:Call your Safe Contact- someone whom you trust who will be there for you.  · Call your local CRISIS HOTLINE 099-3041 or 223-868-0313  · Call your local Children's Mobile Crisis Response Team [place] (120) 825-3322 or www.Cloudbot  · Call the toll free National Suicide Prevention Hotlines   · National Suicide Prevention Lifeline 408-975-TKIG (2953)  · National Hope Line Network 800-SUICIDE (149-3194)    DISCHARGE SURVEY:  Thank you for choosing [facility].  We hope we provided you with very good care.  You may be receiving a survey in the mail.  Please fill it out.  Your opinion is valuable to us.    ADDITIONAL EDUCATIONAL MATERIALS GIVEN TO PATIENT:        My signature on this form indicates that:  1.  I have reviewed and understand the above information  2.  My questions regarding this information have been answered to my satisfaction.  3.  I have formulated a plan with my discharge nurse to  obtain my prescribed medication for home.

## 2019-02-13 NOTE — PROGRESS NOTES
Discharge instructions given to pt, questions answered, pt verbalized understanding.  Bands verified with MOB  Pt discharged in stable condition. Infant in carseat, family escorted out

## 2019-02-13 NOTE — CARE PLAN
Problem: Pain Management  Goal: Pain level will decrease to patient's comfort goal  Outcome: PROGRESSING AS EXPECTED  Pain controlled with prn medications per mar. Pt will call to request pain medications. Will offer pain medications as they become available. Pain management expectations discussed with pt, plan made for the day regarding how to address pain.    Problem: Potential knowledge deficit related to lack of understanding of self and  care  Goal: Patient will verbalize understanding of self and infant care  Outcome: PROGRESSING AS EXPECTED  Pt able to care for self and infant.

## 2019-02-13 NOTE — LACTATION NOTE
This note was copied from a baby's chart.  Follow-up visit. Mother reports baby is breastfeeding well, denies damaged nipples only sore. Reinforced hunger cues, breastfeeding when baby shows cues or by 3 hours from last feed, importance of skin to skin, positioning baby at breast & cluster feeding. Mother has WIC and plans to F/U with them once discharged. Encouraged mother to call for any lactation needs.     Breastfeeding POC:  Breastfeed on demand or by 3 hours from last feed. F/U with WIC once d/c'd.

## 2019-02-13 NOTE — LACTATION NOTE
followup visit. MOB reports baby is breastfeeding frequently, nipples intact bilaterally but tender at beginning of feedings. Discussed norms for latch and tenderness, cluster feeding, feeding frequency first 5 days and growth spurts. Answered MOB questions. Reviewed followup resources for post d/c. Encouraged Breastfeeding Circles for ongoing support. Baby cueing, reviewed feeding on cue. To right breast, with deep latch. Sleepy at breast, encouraged to gently stimulate to sustain suckling. Reviewed nipple to nose positioning for deeper latch. Reviewed pacifier guidelines and importance of frequent feeds for plentiful milk supply.

## 2020-01-29 ENCOUNTER — OFFICE VISIT (OUTPATIENT)
Dept: URGENT CARE | Facility: CLINIC | Age: 25
End: 2020-01-29
Payer: COMMERCIAL

## 2020-01-29 VITALS
RESPIRATION RATE: 14 BRPM | OXYGEN SATURATION: 98 % | WEIGHT: 204.8 LBS | DIASTOLIC BLOOD PRESSURE: 70 MMHG | HEIGHT: 61 IN | TEMPERATURE: 97.6 F | SYSTOLIC BLOOD PRESSURE: 100 MMHG | BODY MASS INDEX: 38.67 KG/M2 | HEART RATE: 82 BPM

## 2020-01-29 DIAGNOSIS — J02.9 SORE THROAT: ICD-10-CM

## 2020-01-29 DIAGNOSIS — J02.9 PHARYNGITIS, UNSPECIFIED ETIOLOGY: ICD-10-CM

## 2020-01-29 DIAGNOSIS — H66.92 LEFT ACUTE OTITIS MEDIA: ICD-10-CM

## 2020-01-29 LAB
INT CON NEG: NORMAL
INT CON POS: NORMAL
S PYO AG THROAT QL: NEGATIVE

## 2020-01-29 PROCEDURE — 87880 STREP A ASSAY W/OPTIC: CPT | Performed by: PHYSICIAN ASSISTANT

## 2020-01-29 PROCEDURE — 99203 OFFICE O/P NEW LOW 30 MIN: CPT | Performed by: PHYSICIAN ASSISTANT

## 2020-01-29 RX ORDER — AMOXICILLIN AND CLAVULANATE POTASSIUM 875; 125 MG/1; MG/1
1 TABLET, FILM COATED ORAL 2 TIMES DAILY
Qty: 14 TAB | Refills: 0 | Status: SHIPPED | OUTPATIENT
Start: 2020-01-29 | End: 2020-02-05

## 2020-01-29 RX ORDER — FLUTICASONE PROPIONATE 50 MCG
2 SPRAY, SUSPENSION (ML) NASAL DAILY
Qty: 16 G | Refills: 0 | Status: SHIPPED | OUTPATIENT
Start: 2020-01-29 | End: 2024-03-12

## 2020-01-29 ASSESSMENT — ENCOUNTER SYMPTOMS
SHORTNESS OF BREATH: 0
SORE THROAT: 1
TROUBLE SWALLOWING: 0
HOARSE VOICE: 1
HEADACHES: 0
COUGH: 0

## 2020-01-30 NOTE — PROGRESS NOTES
Subjective:   Slime Beebe is a 24 y.o. female who presents for Otalgia (x2 days) and Pharyngitis (x2 days)        Breast feeding currently.  Pt works in [place]. Pain worse at higher altitude.    Otalgia    There is pain in both (L>R) ears. This is a new problem. The current episode started yesterday. The problem occurs constantly. The problem has been rapidly worsening. There has been no fever. The pain is at a severity of 6/10. Associated symptoms include a sore throat. Pertinent negatives include no coughing, ear discharge, headaches or hearing loss. Associated symptoms comments: Body aches. She has tried acetaminophen for the symptoms. The treatment provided no relief. There is no history of a chronic ear infection or a tympanostomy tube.   Pharyngitis    This is a new problem. There has been no fever. The pain is moderate. Associated symptoms include ear pain, a hoarse voice and a plugged ear sensation. Pertinent negatives include no congestion, coughing, ear discharge, headaches, shortness of breath or trouble swallowing. She has had no exposure to strep or mono. She has tried acetaminophen for the symptoms. The treatment provided mild relief.     Review of Systems   HENT: Positive for ear pain, hoarse voice and sore throat. Negative for congestion, ear discharge, hearing loss and trouble swallowing.    Respiratory: Negative for cough and shortness of breath.    Neurological: Negative for headaches.       PMH:  has a past medical history of Ulcer (2013).  MEDS:   Current Outpatient Medications:   •  fluticasone (FLONASE) 50 MCG/ACT nasal spray, Spray 2 Sprays in nose every day., Disp: 16 g, Rfl: 0  •  amoxicillin-clavulanate (AUGMENTIN) 875-125 MG Tab, Take 1 Tab by mouth 2 times a day for 7 days., Disp: 14 Tab, Rfl: 0  •  ibuprofen (MOTRIN) 600 MG TABS, Take 1 Tab by mouth every 6 hours as needed (Cramping)., Disp: 30 Tab, Rfl: 3  ALLERGIES:   Allergies   Allergen Reactions   • Latex      Vaginal  "irritation with latex condoms.     SURGHX: History reviewed. No pertinent surgical history.  SOCHX:  reports that she has never smoked. She has never used smokeless tobacco. She reports that she does not drink alcohol or use drugs.  FH: Family history was reviewed, no pertinent findings to report   Objective:   /70 (BP Location: Left arm, Patient Position: Sitting, BP Cuff Size: Large adult)   Pulse 82   Temp 36.4 °C (97.6 °F)   Resp 14   Ht 1.549 m (5' 1\")   Wt 92.9 kg (204 lb 12.8 oz)   SpO2 98%   BMI 38.70 kg/m²   Physical Exam  Vitals signs reviewed.   Constitutional:       General: She is not in acute distress.     Appearance: Normal appearance. She is well-developed. She is not toxic-appearing.   HENT:      Head: Normocephalic and atraumatic.      Right Ear: Ear canal and external ear normal. Tympanic membrane is bulging.      Left Ear: Ear canal and external ear normal. Tympanic membrane is erythematous and bulging.      Ears:      Comments: Fluid and bubbles visible behind left EAC. TM does move with valsalva.     Nose: Nose normal. No mucosal edema, congestion or rhinorrhea.      Mouth/Throat:      Lips: Pink.      Mouth: Mucous membranes are moist.      Pharynx: Oropharynx is clear. Uvula midline. No uvula swelling.      Tonsils: No tonsillar exudate. Swellin+ on the right. 1+ on the left.   Eyes:      General: Lids are normal.      Conjunctiva/sclera: Conjunctivae normal.   Neck:      Musculoskeletal: Neck supple.   Cardiovascular:      Rate and Rhythm: Normal rate and regular rhythm.   Pulmonary:      Effort: Pulmonary effort is normal. No respiratory distress.      Breath sounds: Normal breath sounds.   Musculoskeletal:      Comments: Normal range of motion. Exhibits no edema and no tenderness.    Lymphadenopathy:      Cervical: Cervical adenopathy present.      Right cervical: Superficial cervical adenopathy present. No posterior cervical adenopathy.     Left cervical: Superficial " cervical adenopathy present. No posterior cervical adenopathy.   Skin:     General: Skin is warm and dry.      Capillary Refill: Capillary refill takes less than 2 seconds.   Neurological:      Mental Status: She is alert and oriented to person, place, and time.      Cranial Nerves: No cranial nerve deficit.      Sensory: No sensory deficit.   Psychiatric:         Speech: Speech normal.         Behavior: Behavior normal.         Thought Content: Thought content normal.         Judgment: Judgment normal.           Assessment/Plan:   1. Left acute otitis media  - fluticasone (FLONASE) 50 MCG/ACT nasal spray; Spray 2 Sprays in nose every day.  Dispense: 16 g; Refill: 0  - amoxicillin-clavulanate (AUGMENTIN) 875-125 MG Tab; Take 1 Tab by mouth 2 times a day for 7 days.  Dispense: 14 Tab; Refill: 0    2. Pharyngitis, unspecified etiology    3. Sore throat  - POCT Rapid Strep A    Results for orders placed or performed in visit on 01/29/20   POCT Rapid Strep A   Result Value Ref Range    Rapid Strep Screen Negative     Internal Control Positive Valid     Internal Control Negative Valid      Physical exam consistent with serous left-sided acute otitis media.  TM is mobile and symptoms are very recent in onset.  No significant upper respiratory congestion.  I would like patient to start Flonase as directed for next 3 to 5 days.  If symptoms persist or worsen she may begin antibiotic as directed.  If symptoms significantly change in nature or fail to fully resolve she should see PCP or return to clinic for reevaluation.    Ibuprofen prn pain.     Differential diagnosis, natural history, supportive care, and indications for immediate follow-up discussed.

## 2020-03-22 ENCOUNTER — OFFICE VISIT (OUTPATIENT)
Dept: URGENT CARE | Facility: CLINIC | Age: 25
End: 2020-03-22
Payer: COMMERCIAL

## 2020-03-22 ENCOUNTER — APPOINTMENT (OUTPATIENT)
Dept: RADIOLOGY | Facility: IMAGING CENTER | Age: 25
End: 2020-03-22
Attending: NURSE PRACTITIONER
Payer: COMMERCIAL

## 2020-03-22 VITALS
HEIGHT: 61 IN | HEART RATE: 122 BPM | DIASTOLIC BLOOD PRESSURE: 82 MMHG | OXYGEN SATURATION: 97 % | TEMPERATURE: 97.4 F | SYSTOLIC BLOOD PRESSURE: 116 MMHG | BODY MASS INDEX: 39.65 KG/M2 | WEIGHT: 210 LBS | RESPIRATION RATE: 16 BRPM

## 2020-03-22 DIAGNOSIS — R05.9 COUGH: ICD-10-CM

## 2020-03-22 DIAGNOSIS — J34.89 NASAL CONGESTION WITH RHINORRHEA: ICD-10-CM

## 2020-03-22 DIAGNOSIS — J06.9 VIRAL URI WITH COUGH: ICD-10-CM

## 2020-03-22 DIAGNOSIS — R09.82 PND (POST-NASAL DRIP): ICD-10-CM

## 2020-03-22 DIAGNOSIS — J02.9 SORE THROAT: ICD-10-CM

## 2020-03-22 DIAGNOSIS — R09.81 NASAL CONGESTION WITH RHINORRHEA: ICD-10-CM

## 2020-03-22 PROBLEM — E66.811 OBESITY (BMI 30.0-34.9): Status: ACTIVE | Noted: 2019-11-14

## 2020-03-22 PROBLEM — M25.531: Status: ACTIVE | Noted: 2019-11-14

## 2020-03-22 PROBLEM — E66.9 OBESITY (BMI 30.0-34.9): Status: ACTIVE | Noted: 2019-11-14

## 2020-03-22 PROBLEM — Z80.3 FAMILY HISTORY OF BREAST CANCER: Status: ACTIVE | Noted: 2019-11-14

## 2020-03-22 PROBLEM — H33.319 RETINAL TEAR: Status: ACTIVE | Noted: 2019-11-14

## 2020-03-22 LAB
INT CON NEG: NORMAL
INT CON POS: NORMAL
S PYO AG THROAT QL: NEGATIVE

## 2020-03-22 PROCEDURE — 99214 OFFICE O/P EST MOD 30 MIN: CPT | Performed by: NURSE PRACTITIONER

## 2020-03-22 PROCEDURE — 71046 X-RAY EXAM CHEST 2 VIEWS: CPT | Mod: TC | Performed by: NURSE PRACTITIONER

## 2020-03-22 PROCEDURE — 87880 STREP A ASSAY W/OPTIC: CPT | Performed by: NURSE PRACTITIONER

## 2020-03-22 RX ORDER — FLUTICASONE PROPIONATE 50 MCG
2 SPRAY, SUSPENSION (ML) NASAL DAILY
Qty: 1 BOTTLE | Refills: 0 | Status: SHIPPED | OUTPATIENT
Start: 2020-03-22

## 2020-03-22 RX ORDER — FLUTICASONE PROPIONATE 50 MCG
100 SPRAY, SUSPENSION (ML) NASAL DAILY
COMMUNITY
Start: 2020-01-29 | End: 2024-03-12

## 2020-03-22 ASSESSMENT — ENCOUNTER SYMPTOMS
SINUS PAIN: 0
NAUSEA: 0
PALPITATIONS: 0
CHILLS: 0
CONSTIPATION: 0
BACK PAIN: 0
ABDOMINAL PAIN: 0
TINGLING: 0
HEADACHES: 0
DIARRHEA: 0
VOMITING: 0
DIZZINESS: 0
MYALGIAS: 0
WEAKNESS: 0
ORTHOPNEA: 0
SENSORY CHANGE: 0
SORE THROAT: 1
SHORTNESS OF BREATH: 0
WHEEZING: 0
FEVER: 0
COUGH: 1
SPUTUM PRODUCTION: 0

## 2020-03-22 NOTE — PROGRESS NOTES
Subjective:      Slime Beebe is a 24 y.o. female who presents with Cough (x1wk, cough, hoarse voice, chest pain when coughing)            HPI  C/o cough, nasal congestion, mild runny nose, PND, sore throat x 1 week. Works at [employer] but has no direct patient contact. Was training with someone who had strep and sinus infection. Denies SOB, chest tightness or wheeze, no h/o asthma. No salt water gargle or nasal spray.     PMH:  has a past medical history of Ulcer (2013).  MEDS:   Current Outpatient Medications:   •  Pseudoeph-Doxylamine-DM-APAP (NYQUIL PO), Take  by mouth., Disp: , Rfl:   •  Pseudoephedrine-APAP-DM (DAYQUIL PO), Take  by mouth., Disp: , Rfl:   •  fluticasone (FLONASE) 50 MCG/ACT nasal spray, Spray 2 Sprays in nose every day. (Patient not taking: Reported on 3/22/2020), Disp: 16 g, Rfl: 0  •  ibuprofen (MOTRIN) 600 MG TABS, Take 1 Tab by mouth every 6 hours as needed (Cramping). (Patient not taking: Reported on 3/22/2020), Disp: 30 Tab, Rfl: 3  ALLERGIES:   Allergies   Allergen Reactions   • Latex      Vaginal irritation with latex condoms.     SURGHX: History reviewed. No pertinent surgical history.  SOCHX:  reports that she has never smoked. She has never used smokeless tobacco. She reports that she does not drink alcohol or use drugs.  FH: Family history was reviewed, no pertinent findings to report    Review of Systems   Constitutional: Negative for chills, fever and malaise/fatigue.   HENT: Positive for congestion and sore throat. Negative for ear pain and sinus pain.    Respiratory: Positive for cough. Negative for sputum production, shortness of breath and wheezing.    Cardiovascular: Negative for chest pain, palpitations and orthopnea.   Gastrointestinal: Negative for abdominal pain, constipation, diarrhea, nausea and vomiting.   Musculoskeletal: Negative for back pain and myalgias.   Skin: Negative for itching and rash.   Neurological: Negative for dizziness, tingling,  "sensory change, weakness and headaches.   Endo/Heme/Allergies: Negative for environmental allergies.   All other systems reviewed and are negative.         Objective:     /82 (BP Location: Left arm, Patient Position: Sitting, BP Cuff Size: Large adult)   Pulse (!) 122   Temp 36.3 °C (97.4 °F) (Temporal)   Resp 16   Ht 1.549 m (5' 1\")   Wt 95.3 kg (210 lb)   SpO2 97%   BMI 39.68 kg/m²      Physical Exam  Vitals signs reviewed.   Constitutional:       General: She is awake. She is not in acute distress.     Appearance: Normal appearance. She is well-developed. She is not ill-appearing, toxic-appearing or diaphoretic.   HENT:      Head: Normocephalic.      Right Ear: Ear canal and external ear normal. A middle ear effusion is present.      Left Ear: Ear canal and external ear normal. A middle ear effusion is present.      Nose: Mucosal edema, congestion and rhinorrhea present. No nasal tenderness.      Left Turbinates: Enlarged.      Mouth/Throat:      Mouth: Mucous membranes are moist.      Pharynx: Posterior oropharyngeal erythema present. No pharyngeal swelling, oropharyngeal exudate or uvula swelling.      Tonsils: No tonsillar exudate or tonsillar abscesses. 1+ on the right. 1+ on the left.   Eyes:      General:         Right eye: No discharge.         Left eye: No discharge.      Conjunctiva/sclera: Conjunctivae normal.      Pupils: Pupils are equal, round, and reactive to light.   Neck:      Musculoskeletal: Normal range of motion.   Cardiovascular:      Rate and Rhythm: Normal rate and regular rhythm.   Pulmonary:      Effort: Pulmonary effort is normal. No accessory muscle usage or respiratory distress.      Breath sounds: Normal breath sounds and air entry. No stridor, decreased air movement or transmitted upper airway sounds. No decreased breath sounds, wheezing, rhonchi or rales.   Abdominal:      General: Bowel sounds are normal. There is no distension.      Palpations: Abdomen is soft.      " Tenderness: There is no abdominal tenderness. There is no guarding or rebound.   Musculoskeletal: Normal range of motion.   Lymphadenopathy:      Cervical: No cervical adenopathy.   Skin:     General: Skin is warm and dry.   Neurological:      Mental Status: She is alert and oriented to person, place, and time.   Psychiatric:         Behavior: Behavior is cooperative.                 Assessment/Plan:       1. Sore throat    - POCT Rapid Strep A    2. Cough    - DX-CHEST-2 VIEWS; Future    3. Nasal congestion with rhinorrhea    - fluticasone (FLONASE) 50 MCG/ACT nasal spray; Spray 2 Sprays in nose every day.  Dispense: 1 Bottle; Refill: 0    4. PND (post-nasal drip)    - fluticasone (FLONASE) 50 MCG/ACT nasal spray; Spray 2 Sprays in nose every day.  Dispense: 1 Bottle; Refill: 0    5. Viral URI with cough  D/c Dayquil/Nyquil  Increase water intake  May use Ibuprofen/Tylenol prn for fever or body aches  Get rest  May use daily longer acting antihistamine prn  May use saline nasal spray/flonase prn to flush any nasal congestion   May use OTC cough suppressant medications like Robitussin/Delsym prn  Monitor for fevers, productive cough, SOB, CP, chest tightness- need re-evaluation

## 2020-03-22 NOTE — LETTER
March 22, 2020       Patient: Slime Beebe   YOB: 1995   Date of Visit: 3/22/2020         To Whom It May Concern:    It is my medical opinion that Slime Beebe be allowed to return to work as she is not contagious for flu/strep and no fever. She has not been tested for COVID-19.    If you have any questions or concerns, please don't hesitate to call [phone]          Sincerely,          MARCELINO BardalesRJoyceN.  Electronically Signed

## 2023-08-17 ENCOUNTER — APPOINTMENT (RX ONLY)
Dept: URBAN - METROPOLITAN AREA CLINIC 4 | Facility: CLINIC | Age: 28
Setting detail: DERMATOLOGY
End: 2023-08-17

## 2023-08-17 DIAGNOSIS — Q828 OTHER SPECIFIED ANOMALIES OF SKIN: ICD-10-CM

## 2023-08-17 DIAGNOSIS — D22 MELANOCYTIC NEVI: ICD-10-CM

## 2023-08-17 DIAGNOSIS — D18.0 HEMANGIOMA: ICD-10-CM

## 2023-08-17 DIAGNOSIS — L82.1 OTHER SEBORRHEIC KERATOSIS: ICD-10-CM

## 2023-08-17 DIAGNOSIS — Q819 OTHER SPECIFIED ANOMALIES OF SKIN: ICD-10-CM

## 2023-08-17 DIAGNOSIS — Q826 OTHER SPECIFIED ANOMALIES OF SKIN: ICD-10-CM

## 2023-08-17 DIAGNOSIS — Z71.89 OTHER SPECIFIED COUNSELING: ICD-10-CM

## 2023-08-17 PROBLEM — D18.01 HEMANGIOMA OF SKIN AND SUBCUTANEOUS TISSUE: Status: ACTIVE | Noted: 2023-08-17

## 2023-08-17 PROBLEM — D22.5 MELANOCYTIC NEVI OF TRUNK: Status: ACTIVE | Noted: 2023-08-17

## 2023-08-17 PROBLEM — L85.8 OTHER SPECIFIED EPIDERMAL THICKENING: Status: ACTIVE | Noted: 2023-08-17

## 2023-08-17 PROCEDURE — ? COUNSELING

## 2023-08-17 PROCEDURE — 99203 OFFICE O/P NEW LOW 30 MIN: CPT

## 2023-08-17 PROCEDURE — ? SUNSCREEN RECOMMENDATIONS

## 2023-08-17 ASSESSMENT — LOCATION DETAILED DESCRIPTION DERM
LOCATION DETAILED: LEFT INFERIOR CENTRAL MALAR CHEEK
LOCATION DETAILED: LEFT RADIAL DORSAL HAND
LOCATION DETAILED: RIGHT RADIAL DORSAL HAND
LOCATION DETAILED: LEFT PROXIMAL POSTERIOR UPPER ARM
LOCATION DETAILED: LEFT ANTERIOR PROXIMAL THIGH
LOCATION DETAILED: RIGHT ANTERIOR PROXIMAL THIGH
LOCATION DETAILED: RIGHT INFERIOR MEDIAL UPPER BACK
LOCATION DETAILED: RIGHT MEDIAL UPPER BACK
LOCATION DETAILED: RIGHT PROXIMAL POSTERIOR UPPER ARM
LOCATION DETAILED: RIGHT NASAL SIDEWALL

## 2023-08-17 ASSESSMENT — LOCATION SIMPLE DESCRIPTION DERM
LOCATION SIMPLE: LEFT CHEEK
LOCATION SIMPLE: RIGHT POSTERIOR UPPER ARM
LOCATION SIMPLE: RIGHT NOSE
LOCATION SIMPLE: LEFT THIGH
LOCATION SIMPLE: RIGHT THIGH
LOCATION SIMPLE: LEFT HAND
LOCATION SIMPLE: LEFT POSTERIOR UPPER ARM
LOCATION SIMPLE: RIGHT HAND
LOCATION SIMPLE: RIGHT UPPER BACK

## 2023-08-17 ASSESSMENT — LOCATION ZONE DERM
LOCATION ZONE: ARM
LOCATION ZONE: LEG
LOCATION ZONE: NOSE
LOCATION ZONE: HAND
LOCATION ZONE: TRUNK
LOCATION ZONE: FACE

## 2024-01-27 ENCOUNTER — HOSPITAL ENCOUNTER (OUTPATIENT)
Facility: MEDICAL CENTER | Age: 29
End: 2024-01-27
Attending: NURSE PRACTITIONER
Payer: COMMERCIAL

## 2024-01-27 ENCOUNTER — OFFICE VISIT (OUTPATIENT)
Dept: URGENT CARE | Facility: CLINIC | Age: 29
End: 2024-01-27
Payer: COMMERCIAL

## 2024-01-27 VITALS
DIASTOLIC BLOOD PRESSURE: 80 MMHG | WEIGHT: 198.9 LBS | BODY MASS INDEX: 39.05 KG/M2 | HEART RATE: 72 BPM | TEMPERATURE: 97.2 F | OXYGEN SATURATION: 98 % | SYSTOLIC BLOOD PRESSURE: 118 MMHG | RESPIRATION RATE: 16 BRPM | HEIGHT: 60 IN

## 2024-01-27 DIAGNOSIS — Z20.2 EXPOSURE TO STD: ICD-10-CM

## 2024-01-27 DIAGNOSIS — N89.8 VAGINAL DISCHARGE: ICD-10-CM

## 2024-01-27 PROCEDURE — 3079F DIAST BP 80-89 MM HG: CPT | Performed by: NURSE PRACTITIONER

## 2024-01-27 PROCEDURE — 3074F SYST BP LT 130 MM HG: CPT | Performed by: NURSE PRACTITIONER

## 2024-01-27 PROCEDURE — 87591 N.GONORRHOEAE DNA AMP PROB: CPT

## 2024-01-27 PROCEDURE — 87660 TRICHOMONAS VAGIN DIR PROBE: CPT

## 2024-01-27 PROCEDURE — 87510 GARDNER VAG DNA DIR PROBE: CPT

## 2024-01-27 PROCEDURE — 99203 OFFICE O/P NEW LOW 30 MIN: CPT | Performed by: NURSE PRACTITIONER

## 2024-01-27 PROCEDURE — 87491 CHLMYD TRACH DNA AMP PROBE: CPT

## 2024-01-27 PROCEDURE — 87480 CANDIDA DNA DIR PROBE: CPT

## 2024-01-27 RX ORDER — COPPER 313.4 MG/1
INTRAUTERINE DEVICE INTRAUTERINE
COMMUNITY

## 2024-01-27 RX ORDER — SEMAGLUTIDE 0.25 MG/.5ML
INJECTION, SOLUTION SUBCUTANEOUS
COMMUNITY

## 2024-01-27 RX ORDER — SUMATRIPTAN 100 MG/1
50-100 TABLET, FILM COATED ORAL
COMMUNITY
Start: 2023-11-13

## 2024-01-27 ASSESSMENT — ENCOUNTER SYMPTOMS
FEVER: 0
FLANK PAIN: 0
CHILLS: 0
ABDOMINAL PAIN: 0
VAGINITIS: 1

## 2024-01-27 NOTE — PROGRESS NOTES
Subjective:   Slime Gonzalez is a 28 y.o. female who presents for Exposure to STD (X4days vaginal white thick discharge foul odor/)      Exposure to STD  This is a new problem. The current episode started in the past 7 days. The problem occurs constantly. The problem has been unchanged. Pertinent negatives include no abdominal pain, chills, fever or urinary symptoms. Associated symptoms comments: Vaginal discharge. Nothing aggravates the symptoms. She has tried nothing for the symptoms.   Vaginitis  The patient's primary symptoms include a genital odor and vaginal discharge. This is a new problem. Episode onset: 4 days. The problem occurs constantly. The problem has been unchanged. She is not pregnant. Pertinent negatives include no abdominal pain, chills, discolored urine, dysuria, fever, flank pain, frequency, hematuria, painful intercourse or urgency. The vaginal discharge was copious, white, thick and malodorous. There has been no bleeding. She is sexually active. It is possible that her partner has an STD. Her past medical history is significant for an STD.       Review of Systems   Constitutional:  Negative for chills and fever.   Gastrointestinal:  Negative for abdominal pain.   Genitourinary:  Positive for vaginal discharge. Negative for dysuria, flank pain, frequency, hematuria and urgency.        Vaginal discharge   All other systems reviewed and are negative.      Medications, Allergies, and current problem list reviewed today in Epic.     Objective:     /80 (BP Location: Left arm, Patient Position: Sitting)   Pulse 72   Temp 36.2 °C (97.2 °F) (Temporal)   Resp 16   Ht 1.524 m (5')   Wt 90.2 kg (198 lb 14.4 oz)   SpO2 98%     Physical Exam  Vitals reviewed.   Constitutional:       General: She is not in acute distress.     Appearance: Normal appearance. She is not ill-appearing.   HENT:      Head: Normocephalic and atraumatic.      Nose: Nose normal.      Mouth/Throat:      Mouth:  Mucous membranes are moist.      Pharynx: Oropharynx is clear.   Eyes:      Extraocular Movements: Extraocular movements intact.      Conjunctiva/sclera: Conjunctivae normal.      Pupils: Pupils are equal, round, and reactive to light.   Cardiovascular:      Rate and Rhythm: Normal rate and regular rhythm.   Pulmonary:      Effort: Pulmonary effort is normal.      Breath sounds: Normal breath sounds.   Abdominal:      General: Abdomen is flat.      Palpations: Abdomen is soft.   Musculoskeletal:         General: Normal range of motion.      Cervical back: Normal range of motion and neck supple.   Skin:     General: Skin is warm and dry.      Capillary Refill: Capillary refill takes less than 2 seconds.   Neurological:      General: No focal deficit present.      Mental Status: She is alert.   Psychiatric:         Mood and Affect: Mood normal.         Behavior: Behavior normal.         Assessment/Plan:     Diagnosis and associated orders:     1. Exposure to STD  VAGINAL PATHOGENS DNA PANEL    Chlaymydia & N. Gonorrhea      2. Vaginal discharge  VAGINAL PATHOGENS DNA PANEL    Chlaymydia & N. Gonorrhea         Comments/MDM:     Patient was swabbed for GC/Chlamydia and vaginal pathogens today.  She is unsure of what she was exposed to. She will be notified of her results when available and treatment will be provided at that time.           Differential diagnosis, natural history, supportive care, and indications for immediate follow-up discussed.    Advised the patient to follow-up with the primary care physician for recheck, reevaluation, and consideration of further management.    Please note that this dictation was created using voice recognition software. I have made a reasonable attempt to correct obvious errors, but I expect that there are errors of grammar and possibly content that I did not discover before finalizing the note.    This note was electronically signed by KATE Harding

## 2024-01-28 LAB
C TRACH DNA GENITAL QL NAA+PROBE: NEGATIVE
CANDIDA DNA VAG QL PROBE+SIG AMP: NEGATIVE
G VAGINALIS DNA VAG QL PROBE+SIG AMP: NEGATIVE
N GONORRHOEA DNA GENITAL QL NAA+PROBE: NEGATIVE
SPECIMEN SOURCE: NORMAL
T VAGINALIS DNA VAG QL PROBE+SIG AMP: NEGATIVE

## 2024-02-29 ENCOUNTER — HOSPITAL ENCOUNTER (OUTPATIENT)
Facility: MEDICAL CENTER | Age: 29
End: 2024-02-29
Payer: COMMERCIAL

## 2024-02-29 ENCOUNTER — OFFICE VISIT (OUTPATIENT)
Dept: URGENT CARE | Facility: CLINIC | Age: 29
End: 2024-02-29
Payer: COMMERCIAL

## 2024-02-29 VITALS
BODY MASS INDEX: 36.73 KG/M2 | HEART RATE: 77 BPM | DIASTOLIC BLOOD PRESSURE: 72 MMHG | RESPIRATION RATE: 18 BRPM | TEMPERATURE: 98 F | HEIGHT: 60 IN | OXYGEN SATURATION: 97 % | WEIGHT: 187.1 LBS | SYSTOLIC BLOOD PRESSURE: 116 MMHG

## 2024-02-29 DIAGNOSIS — Z20.2 STD EXPOSURE: ICD-10-CM

## 2024-02-29 DIAGNOSIS — R30.0 DYSURIA: ICD-10-CM

## 2024-02-29 DIAGNOSIS — N76.0 ACUTE VAGINITIS: ICD-10-CM

## 2024-02-29 DIAGNOSIS — Z20.2 CHLAMYDIA CONTACT: ICD-10-CM

## 2024-02-29 LAB
APPEARANCE UR: CLEAR
BILIRUB UR STRIP-MCNC: NEGATIVE MG/DL
COLOR UR AUTO: YELLOW
GLUCOSE UR STRIP.AUTO-MCNC: NEGATIVE MG/DL
KETONES UR STRIP.AUTO-MCNC: NEGATIVE MG/DL
LEUKOCYTE ESTERASE UR QL STRIP.AUTO: NEGATIVE
NITRITE UR QL STRIP.AUTO: NEGATIVE
PH UR STRIP.AUTO: 6.5 [PH] (ref 5–8)
POCT INT CON NEG: NEGATIVE
POCT INT CON POS: POSITIVE
POCT URINE PREGNANCY TEST: NEGATIVE
PROT UR QL STRIP: NEGATIVE MG/DL
RBC UR QL AUTO: NEGATIVE
SP GR UR STRIP.AUTO: 1.02
UROBILINOGEN UR STRIP-MCNC: 0.2 MG/DL

## 2024-02-29 PROCEDURE — 87491 CHLMYD TRACH DNA AMP PROBE: CPT

## 2024-02-29 PROCEDURE — 87591 N.GONORRHOEAE DNA AMP PROB: CPT

## 2024-02-29 PROCEDURE — 81002 URINALYSIS NONAUTO W/O SCOPE: CPT

## 2024-02-29 PROCEDURE — 87510 GARDNER VAG DNA DIR PROBE: CPT

## 2024-02-29 PROCEDURE — 3078F DIAST BP <80 MM HG: CPT

## 2024-02-29 PROCEDURE — 87086 URINE CULTURE/COLONY COUNT: CPT

## 2024-02-29 PROCEDURE — 87480 CANDIDA DNA DIR PROBE: CPT

## 2024-02-29 PROCEDURE — 87660 TRICHOMONAS VAGIN DIR PROBE: CPT

## 2024-02-29 PROCEDURE — 3074F SYST BP LT 130 MM HG: CPT

## 2024-02-29 PROCEDURE — 81025 URINE PREGNANCY TEST: CPT

## 2024-02-29 PROCEDURE — 99214 OFFICE O/P EST MOD 30 MIN: CPT

## 2024-02-29 RX ORDER — FLUCONAZOLE 150 MG/1
150 TABLET ORAL DAILY
Qty: 1 TABLET | Refills: 0 | Status: SHIPPED | OUTPATIENT
Start: 2024-02-29 | End: 2024-03-12

## 2024-02-29 RX ORDER — DOXYCYCLINE HYCLATE 100 MG
100 TABLET ORAL 2 TIMES DAILY
Qty: 14 TABLET | Refills: 0 | Status: SHIPPED | OUTPATIENT
Start: 2024-02-29 | End: 2024-03-07

## 2024-03-01 DIAGNOSIS — N76.0 BV (BACTERIAL VAGINOSIS): ICD-10-CM

## 2024-03-01 DIAGNOSIS — B96.89 BV (BACTERIAL VAGINOSIS): ICD-10-CM

## 2024-03-01 LAB
C TRACH DNA GENITAL QL NAA+PROBE: NEGATIVE
CANDIDA DNA VAG QL PROBE+SIG AMP: NEGATIVE
G VAGINALIS DNA VAG QL PROBE+SIG AMP: POSITIVE
N GONORRHOEA DNA GENITAL QL NAA+PROBE: NEGATIVE
SPECIMEN SOURCE: NORMAL
T VAGINALIS DNA VAG QL PROBE+SIG AMP: NEGATIVE

## 2024-03-01 RX ORDER — METRONIDAZOLE 500 MG/1
500 TABLET ORAL 2 TIMES DAILY
Qty: 14 TABLET | Refills: 0 | Status: SHIPPED | OUTPATIENT
Start: 2024-03-01 | End: 2024-03-08

## 2024-03-01 NOTE — PROGRESS NOTES
Subjective:   Slime Gonzalez is a 28 y.o. female who presents for Follow-Up (X6DAYS patient was exposed to chlamydia/would like STD'S test )      HPI:    Patient presents to urgent care with concerns of known chlamydia exposure  Patient states she would like to have be tested for chlamydia and gonorrhea  She would also like to be started on treatment for chlamydia.  States she engaged in unprotected sexual intercourse with her ex-BF who informed her he tested positive for chlamydia after intercourse.  Reports thin clear vaginal discharge  Denies abdominal pain, fever, chills, back pain  Denies dysuria, hematuria, urinary frequency, hesitancy  Denies rash    ROS As above in HPI    Medications:    Current Outpatient Medications on File Prior to Visit   Medication Sig Dispense Refill    Paragard Intrauterine Copper IUD by Intrauterine route.      WEGOVY 0.25 MG/0.5ML Solution Auto-injector Pen-injector Inject 0.5 mL (0.25 mg) by subcutaneous injection every 7 days.      sumatriptan (IMITREX) 100 MG tablet Take  mg by mouth.      Pseudoeph-Doxylamine-DM-APAP (NYQUIL PO) Take  by mouth. (Patient not taking: Reported on 1/27/2024)      Pseudoephedrine-APAP-DM (DAYQUIL PO) Take  by mouth. (Patient not taking: Reported on 1/27/2024)      fluticasone (FLONASE) 50 MCG/ACT nasal spray Spray 2 Sprays in nose every day. 1 Bottle 0    fluticasone (FLONASE) 50 MCG/ACT nasal spray Spray 100 mcg in nose every day. (Patient not taking: Reported on 1/27/2024)      fluticasone (FLONASE) 50 MCG/ACT nasal spray Spray 2 Sprays in nose every day. (Patient not taking: Reported on 3/22/2020) 16 g 0    ibuprofen (MOTRIN) 600 MG TABS Take 1 Tab by mouth every 6 hours as needed (Cramping). (Patient not taking: Reported on 3/22/2020) 30 Tab 3     No current facility-administered medications on file prior to visit.        Allergies:   Latex    Problem List:   Patient Active Problem List   Diagnosis    Family history of breast  cancer    Obesity (BMI 30.0-34.9)    Pain in the wrist, right    Retinal tear        Surgical History:  No past surgical history on file.    Past Social Hx:   Social History     Tobacco Use    Smoking status: Never    Smokeless tobacco: Never   Vaping Use    Vaping Use: Never used   Substance Use Topics    Alcohol use: Yes    Drug use: No          Problem list, medications, and allergies reviewed by myself today in Epic.     Objective:     /72 (BP Location: Left arm, Patient Position: Sitting)   Pulse 77   Temp 36.7 °C (98 °F) (Temporal)   Resp 18   Ht 1.524 m (5')   Wt 84.9 kg (187 lb 1.6 oz)   LMP 02/15/2024 (Exact Date)   SpO2 97%   BMI 36.54 kg/m²     Physical Exam  Vitals and nursing note reviewed.   Constitutional:       Appearance: Normal appearance.   HENT:      Head: Normocephalic and atraumatic.   Cardiovascular:      Rate and Rhythm: Normal rate and regular rhythm.      Heart sounds: Normal heart sounds.   Pulmonary:      Effort: Pulmonary effort is normal.      Breath sounds: Normal breath sounds.   Abdominal:      General: Abdomen is flat. Bowel sounds are normal.      Palpations: Abdomen is soft.      Tenderness: There is no abdominal tenderness. There is no right CVA tenderness or left CVA tenderness.   Neurological:      Mental Status: She is alert and oriented to person, place, and time.         Assessment/Plan:       Results for orders placed or performed during the hospital encounter of 01/27/24   VAGINAL PATHOGENS DNA PANEL   Result Value Ref Range    Candida species DNA Probe Negative Negative    Trichamonas vaginalis DNA Probe Negative Negative    Gardnerella vaginalis DNA Probe Negative Negative   Chlamydia/GC, PCR (Genital/Anal swab)   Result Value Ref Range    C. trachomatis by PCR Negative Negative    N. gonorrhoeae by PCR Negative Negative    Source Genital        Diagnosis and associated orders:   1. Acute vaginitis  - POCT Urinalysis  - POCT Pregnancy  - VAGINAL PATHOGENS  DNA PANEL; Future    2. STD exposure  - POCT Urinalysis  - POCT Pregnancy  - Chlamydia/GC, PCR (Genital/Anal swab); Future    3. Chlamydia contact  - doxycycline (VIBRAMYCIN) 100 MG Tab; Take 1 Tablet by mouth 2 times a day for 7 days.  Dispense: 14 Tablet; Refill: 0    4. Dysuria  - POCT Urinalysis  - POCT Pregnancy  - URINE CULTURE(NEW); Future        Comments/MDM:     Patient presents for testing and empiric treatment for chlamydia after her partner informed he has tested positive for chlamydia.   GC and vaginal pathogen tests obtained.   Neg poc hcg. UA: -blood, -LE, -nitrite. Urine culture ordered to confirm.  Doxycycline ordered.   Patient instructed to avoid all forms of sexual activities until her test results are available for review and she has completed potential forms of treatment and she is asymptomatic.   Patient declined additional STD testing today.  Patient verbalized understanding and consented to plan of care.       Return to clinic or go to ED if symptoms worsen or persist. Indications for ED discussed at length. Patient/Parent/Guardian voices understanding. Follow-up with your primary care provider in 3-5 days. Red flag symptoms discussed. All side effects of medication discussed including allergic response, GI upset, tendon injury, rash, sedation etc.    Please note that this dictation was created using voice recognition software. I have made a reasonable attempt to correct obvious errors, but I expect that there are errors of grammar and possibly content that I did not discover before finalizing the note.    This note was electronically signed by KATE Bush

## 2024-03-03 LAB
BACTERIA UR CULT: NORMAL
SIGNIFICANT IND 70042: NORMAL
SITE SITE: NORMAL
SOURCE SOURCE: NORMAL

## 2024-03-12 ENCOUNTER — OFFICE VISIT (OUTPATIENT)
Dept: URGENT CARE | Facility: PHYSICIAN GROUP | Age: 29
End: 2024-03-12
Payer: COMMERCIAL

## 2024-03-12 VITALS
HEART RATE: 74 BPM | HEIGHT: 60 IN | BODY MASS INDEX: 37.46 KG/M2 | SYSTOLIC BLOOD PRESSURE: 102 MMHG | WEIGHT: 190.81 LBS | RESPIRATION RATE: 16 BRPM | OXYGEN SATURATION: 100 % | DIASTOLIC BLOOD PRESSURE: 60 MMHG | TEMPERATURE: 97.3 F

## 2024-03-12 DIAGNOSIS — R19.7 DIARRHEA, UNSPECIFIED TYPE: ICD-10-CM

## 2024-03-12 DIAGNOSIS — R10.9 ABDOMINAL PAIN, UNSPECIFIED ABDOMINAL LOCATION: ICD-10-CM

## 2024-03-12 LAB
APPEARANCE UR: CLEAR
BILIRUB UR STRIP-MCNC: NEGATIVE MG/DL
COLOR UR AUTO: NORMAL
GLUCOSE UR STRIP.AUTO-MCNC: NEGATIVE MG/DL
KETONES UR STRIP.AUTO-MCNC: NEGATIVE MG/DL
LEUKOCYTE ESTERASE UR QL STRIP.AUTO: NEGATIVE
NITRITE UR QL STRIP.AUTO: NEGATIVE
PH UR STRIP.AUTO: 5.5 [PH] (ref 5–8)
POCT INT CON NEG: NEGATIVE
POCT INT CON POS: POSITIVE
POCT URINE PREGNANCY TEST: NEGATIVE
PROT UR QL STRIP: NEGATIVE MG/DL
RBC UR QL AUTO: NEGATIVE
SP GR UR STRIP.AUTO: >=1.03
UROBILINOGEN UR STRIP-MCNC: 0.2 MG/DL

## 2024-03-12 PROCEDURE — 3074F SYST BP LT 130 MM HG: CPT | Performed by: NURSE PRACTITIONER

## 2024-03-12 PROCEDURE — 81002 URINALYSIS NONAUTO W/O SCOPE: CPT | Performed by: NURSE PRACTITIONER

## 2024-03-12 PROCEDURE — 81025 URINE PREGNANCY TEST: CPT | Performed by: NURSE PRACTITIONER

## 2024-03-12 PROCEDURE — 3078F DIAST BP <80 MM HG: CPT | Performed by: NURSE PRACTITIONER

## 2024-03-12 PROCEDURE — 99214 OFFICE O/P EST MOD 30 MIN: CPT | Performed by: NURSE PRACTITIONER

## 2024-03-12 NOTE — LETTER
March 12, 2024       Patient: Slime Gonzalez   YOB: 1995   Date of Visit: 3/12/2024         To Whom It May Concern:    In my medical opinion, I recommend that Slime Gonzalez return to full duty, no restrictions on 03/13/24. Please excuse work absences for this medical problem.               Sincerely,          Helena Ly, A.P.N.  Electronically Signed

## 2024-03-13 NOTE — PROGRESS NOTES
Slime Gonzalez is a 28 y.o. female who presents for Abdominal Pain (C/o abd pain, cramps, and diarrhea x1 day. Pt is currently on wegovy, unsure if it's related has been on it since 01/2024 but started new dose last week. Has been having increased constipation since starting. No appetite despite not having a BM today. )      HPI  This is a new problem. Slime Gonzalez is a 28 y.o. patient who presents to urgent care with c/o: 2 days of abd cramps. Last BM at 5 am  today -large amount of diarrhea - liquid wihtout blood.  Today it has been hard to drink fluids because she feels that she has abdominal discomfort and is concerned that she may have diarrhea again.  She says generally she does not drink as much water as she should.  She has been mildly  constipated with her wygovy medication.  - normal BM 3 days ago.   LMP:  Feb 15, 2024.   Denies fever, back pain, nausea, vomiting, flank pain, bodyaches..    ROS See HPI    Allergies:       Allergies   Allergen Reactions    Latex      Vaginal irritation with latex condoms.       PMSFS Hx:  Past Medical History:   Diagnosis Date    Ulcer 2013    Pt states this has resolved      History reviewed. No pertinent surgical history.  History reviewed. No pertinent family history.  Social History     Tobacco Use    Smoking status: Never    Smokeless tobacco: Never   Substance Use Topics    Alcohol use: Yes       Problems:   Patient Active Problem List   Diagnosis    Family history of breast cancer    Obesity (BMI 30.0-34.9)    Pain in the wrist, right    Retinal tear       Medications:   Current Outpatient Medications on File Prior to Visit   Medication Sig Dispense Refill    Paragard Intrauterine Copper IUD by Intrauterine route.      WEGOVY 0.25 MG/0.5ML Solution Auto-injector Pen-injector Inject 0.5 mL (0.25 mg) by subcutaneous injection every 7 days.      sumatriptan (IMITREX) 100 MG tablet Take  mg by mouth.      fluticasone (FLONASE) 50 MCG/ACT nasal  spray Spray 2 Sprays in nose every day. 1 Bottle 0     No current facility-administered medications on file prior to visit.        Objective:     /60 (BP Location: Right arm, Patient Position: Sitting, BP Cuff Size: Adult)   Pulse 74   Temp 36.3 °C (97.3 °F) (Temporal)   Resp 16   Ht 1.524 m (5') Comment: Pt reported  Wt 86.5 kg (190 lb 12.9 oz)   LMP 02/15/2024 (Exact Date)   SpO2 100%   BMI 37.26 kg/m²     Physical Exam  Vitals and nursing note reviewed.   Constitutional:       General: She is in acute distress.      Appearance: Normal appearance. She is well-developed and well-groomed. She is not ill-appearing or toxic-appearing.   HENT:      Right Ear: Hearing normal.      Left Ear: Hearing normal.      Mouth/Throat:      Lips: Pink.      Mouth: Mucous membranes are moist.   Eyes:      General: Lids are normal.   Neck:      Trachea: Trachea and phonation normal.   Cardiovascular:      Rate and Rhythm: Normal rate and regular rhythm.      Pulses: Normal pulses.      Heart sounds: Normal heart sounds.   Pulmonary:      Effort: Pulmonary effort is normal.      Breath sounds: Normal breath sounds.   Musculoskeletal:      Cervical back: Full passive range of motion without pain and normal range of motion.   Lymphadenopathy:      Upper Body:      Right upper body: No supraclavicular adenopathy.      Left upper body: No supraclavicular adenopathy.   Skin:     General: Skin is warm and dry.      Capillary Refill: Capillary refill takes less than 2 seconds.   Neurological:      Mental Status: She is alert and oriented to person, place, and time.   Psychiatric:         Mood and Affect: Mood normal.         Speech: Speech normal.         Behavior: Behavior normal. Behavior is cooperative.         Thought Content: Thought content normal.       Results for orders placed or performed in visit on 03/12/24   POCT Urinalysis   Result Value Ref Range    POC Color Dark Yellow Negative    POC Appearance Clear Negative     POC Glucose Negative Negative mg/dL    POC Bilirubin Negative Negative mg/dL    POC Ketones Negative Negative mg/dL    POC Specific Gravity >=1.030 <1.005 - >1.030    POC Blood Negative Negative    POC Urine PH 5.5 5.0 - 8.0    POC Protein Negative Negative mg/dL    POC Urobiligen 0.2 Negative (0.2) mg/dL    POC Nitrites Negative Negative    POC Leukocyte Esterase Negative Negative   POCT Pregnancy   Result Value Ref Range    POC Urine Pregnancy Test Negative     Internal Control Positive Positive     Internal Control Negative Negative        Assessment /Associated Orders:      1. Abdominal pain, unspecified abdominal location  POCT Urinalysis    POCT Pregnancy      2. Diarrhea, unspecified type                Medical Decision Making:    Slime is a very pleasant 28 y.o. female who is clinically stable at today's acute urgent care visit.  No acute distress noted.  VSS. Appropriate for outpatient care at this time.   Acute problem today with uncertain prognosis.  No acute findings on exam today.  Her abdomen is soft and nondistended.  She is mildly tender in the left side of her abdomen.  She reports having a large amount of diarrhea 1 time today.  She denies nausea or vomiting.  Her last bowel movement was 3 days ago and it was hard and constipated.  She declines abdominal x-ray today.  Her urinalysis was negative for acute infection however does show that her urine is very concentrated.  She was educated that hydration is a verma factor to her weight loss that she is trying to accomplish.  Differential Diagnosis includes but is not limited to: Medication side effect, HGE, diarrhea after having constipation.  Increase fiber and fluids in diet  Follow-up with PCP  Discussed Dx, management options (risks,benefits, and alternatives to planned treatment), natural progression and supportive care.  Expressed understanding and the treatment plan was agreed upon.   Questions were encouraged and answered   Return to urgent  care prn if new or worsening sx or if there is no improvement in condition prn.    Educated in Red flags and indications to immediately call 911 or present to the Emergency Department.          I have spent 31 minutes on the care of Slime Gonzalez.  This includes preparing for the urgent care visit. This time includes review of previous visits/ documents, obtaining HPI, examination and evaluation of patient, ordering and interpretation of labs, imaging, tests, medical management, counseling, education and documentation.        Please note that this dictation was created using voice recognition software. I have worked with consultants from the vendor as well as technical experts from [facility] to optimize the interface. I have made every reasonable attempt to correct obvious errors, but I expect that there are errors of grammar and possibly content that I did not discover before finalizing the note.  This note was electronically signed by provider

## 2025-01-27 ENCOUNTER — OFFICE VISIT (OUTPATIENT)
Dept: URGENT CARE | Facility: PHYSICIAN GROUP | Age: 30
End: 2025-01-27
Payer: COMMERCIAL

## 2025-01-27 VITALS
HEART RATE: 65 BPM | OXYGEN SATURATION: 100 % | DIASTOLIC BLOOD PRESSURE: 64 MMHG | BODY MASS INDEX: 31.8 KG/M2 | WEIGHT: 162 LBS | RESPIRATION RATE: 16 BRPM | TEMPERATURE: 98.4 F | HEIGHT: 60 IN | SYSTOLIC BLOOD PRESSURE: 118 MMHG

## 2025-01-27 DIAGNOSIS — R39.9 UTI SYMPTOMS: ICD-10-CM

## 2025-01-27 LAB
APPEARANCE UR: CLEAR
BILIRUB UR STRIP-MCNC: NEGATIVE MG/DL
COLOR UR AUTO: NORMAL
GLUCOSE UR STRIP.AUTO-MCNC: NEGATIVE MG/DL
KETONES UR STRIP.AUTO-MCNC: NEGATIVE MG/DL
LEUKOCYTE ESTERASE UR QL STRIP.AUTO: NEGATIVE
NITRITE UR QL STRIP.AUTO: NEGATIVE
PH UR STRIP.AUTO: 6 [PH] (ref 5–8)
POCT INT CON NEG: NEGATIVE
POCT INT CON POS: POSITIVE
POCT URINE PREGNANCY TEST: NEGATIVE
PROT UR QL STRIP: NEGATIVE MG/DL
RBC UR QL AUTO: NEGATIVE
SP GR UR STRIP.AUTO: 1.01
UROBILINOGEN UR STRIP-MCNC: 0.2 MG/DL

## 2025-01-27 PROCEDURE — 81025 URINE PREGNANCY TEST: CPT | Performed by: FAMILY MEDICINE

## 2025-01-27 PROCEDURE — 99213 OFFICE O/P EST LOW 20 MIN: CPT | Performed by: FAMILY MEDICINE

## 2025-01-27 PROCEDURE — 3074F SYST BP LT 130 MM HG: CPT | Performed by: FAMILY MEDICINE

## 2025-01-27 PROCEDURE — 3078F DIAST BP <80 MM HG: CPT | Performed by: FAMILY MEDICINE

## 2025-01-27 PROCEDURE — 81002 URINALYSIS NONAUTO W/O SCOPE: CPT | Performed by: FAMILY MEDICINE

## 2025-01-27 NOTE — PROGRESS NOTES
Subjective:      29 y.o. female presents to urgent care for concerns of a bladder infection.  For the last week she has been experiencing increased urinary urgency and frequency.  No associated dysuria or hematuria.  Bowel movements are regular and soft.  She drinks an average of 3 L of water and 1 caffeinated beverages daily.  She is currently sexually active with one, male partner and they do not use any form of contraception. She denies any vaginal discharge ,odors, or lesions. She is up to date on her pap tests. She had a colposcopy in December 2024.    She denies any other questions or concerns at this time.    Current problem list, medication, and past medical/surgical history were reviewed in Epic.    ROS  See HPI     Objective:      /64 (BP Location: Left arm, Patient Position: Sitting, BP Cuff Size: Adult)   Pulse 65   Temp 36.9 °C (98.4 °F) (Temporal)   Resp 16   Ht 1.524 m (5')   Wt 73.5 kg (162 lb)   SpO2 100%   BMI 31.64 kg/m²     Physical Exam  Constitutional:       General: She is not in acute distress.     Appearance: She is not diaphoretic.   Cardiovascular:      Rate and Rhythm: Normal rate and regular rhythm.      Heart sounds: Normal heart sounds.   Pulmonary:      Effort: Pulmonary effort is normal. No respiratory distress.      Breath sounds: Normal breath sounds.   Abdominal:      General: Bowel sounds are normal.      Palpations: Abdomen is soft.      Tenderness: There is no abdominal tenderness. There is no right CVA tenderness or left CVA tenderness.   Neurological:      Mental Status: She is alert.   Psychiatric:         Mood and Affect: Affect normal.         Judgment: Judgment normal.       Assessment/Plan:     1. UTI symptoms  hCG negative.  No sign of infection on urinalysis.  She is encouraged to continue to stay well-hydrated and follow-up with gynecology.  - POCT Urinalysis  - POCT PREGNANCY      Instructed to return to Urgent Care or nearest Emergency Department if  symptoms fail to improve, for any change in condition, further concerns, or new concerning symptoms. Patient states understanding of the plan of care and discharge instructions.    Shannan Tellez M.D.

## 2025-03-26 NOTE — Clinical Note
REFERRAL APPROVAL NOTICE         Sent on March 26, 2025                   Slime ReidloriEri  [address] [place] [address]                   Dear Ms. Gonzalez,    After a careful review of the medical information and benefit coverage, [facility] has processed your referral. See below for additional details.    If applicable, you must be actively enrolled with your insurance for coverage of the authorized service. If you have any questions regarding your coverage, please contact your insurance directly.    REFERRAL INFORMATION   Referral #:  [id number]  Referred-To Department    Referred-By Provider:  Maternal Fetal Medicine    Karo Motta M.D.   Maternal Fetal Med      [address] [address] [address]  [phone] [address], [address]  [place] [id number] [phone]    Referral Start Date:  03/26/2025  Referral End Date:   03/26/2026             SCHEDULING  If you do not already have an appointment, please call [phone] to make an appointment.     MORE INFORMATION  If you do not already have a Get[employer] account, sign up at: [email or web address]  You can access your medical information, make appointments, see lab results, billing information, and more.  If you have questions regarding this referral, please contact  the [facility] Referrals department at:             [phone]. Monday - Friday 8:00AM - 5:00PM.     Sincerely,    [facility]

## 2025-03-31 ENCOUNTER — OFFICE VISIT (OUTPATIENT)
Dept: MATERNAL FETAL MEDICINE | Facility: MEDICAL CENTER | Age: 30
End: 2025-03-31
Payer: COMMERCIAL

## 2025-03-31 VITALS
HEART RATE: 68 BPM | BODY MASS INDEX: 33.45 KG/M2 | SYSTOLIC BLOOD PRESSURE: 110 MMHG | DIASTOLIC BLOOD PRESSURE: 64 MMHG | WEIGHT: 171.3 LBS

## 2025-03-31 DIAGNOSIS — Z3A.12 12 WEEKS GESTATION OF PREGNANCY: ICD-10-CM

## 2025-03-31 DIAGNOSIS — O28.5 ABNORMAL CHROMOSOMAL AND GENETIC FINDING ON ANTENATAL SCREENING MOTHER: ICD-10-CM

## 2025-03-31 NOTE — PROGRESS NOTES
MATERNAL FETAL MEDICINE CONSULTATION:    CC:  NIPT LOW FETAL FRACTION INCREASE RISK OF TRIPLOIDY, T13 OR 18.    HPI:  29-year-old G3, P2 with a working EDC of 2025 currently 12 weeks gestation.  She has been referred for consultation regarding low fetal fraction NIPT with increased risk of triploidy, T18 or T13.  Fetal fraction 2.5%.  She had her second NIPT drawn 3 days ago.    PMHX:  Reports history of migraines previously on sumatriptan but has stopped due to pregnancy.    Denies asthma, seizures, cardiovascular, respiratory, GI,  or endocrine disorders.    OPERATIONS:  Arthroscopy of the knee    TRANSFUSIONS: Denies    ALLERGIES: Denies    STI: Denies    HABITS: Denies    MEDICATIONS: Prenatal vitamins    OB HISTORY:  A:  Term male, 7-7, , no complications.  A:  Term male, 7-13, , no complications.  B: Current    FAMILY HISTORY:  Patient's sister with scoliosis.  Otherwise family history is unremarkable for birth defects.    DISCUSSION:  ANEUPLOIDY BACKGROUND INFORMATION:  I explained chromosomes, nondisjunction, and the characteristics of the most common trisomies including 21, 18 and 13.  We also discussed the variable nature of Down syndrome and the high mortality of trisomy 18 and 13.  I explained the chance for nondisjunction to occur increases with maternal age and is not usually associated with anything that occurs during or before at the pregnancy or the family history.    NON INVASIVE PRENATAL SCREENING  Noninvasive prenatal screening also known as NIPT, and IPS or cell free DNA is a noninvasive early prenatal test capable of determining the risk for fetal chromosome aneuploidies from a maternal blood sample by analyzing cell free fetal DNA.  The test is designed to screen for Down syndrome, trisomy 18 and trisomy 13.  Most platforms also screen for sex chromosome aneuploidy and or fetal sex via the presence or absence of the Y chromosome.  In general NIPT platforms show  detection rates of greater than 99.2% for Down syndrome 96.3% for trisomy 18 and 91.7% for trisomy 13.  The detection rate for sex chromosome aneuploidies is condition and lab specific.  The false positive rate is dependent on the patient's a priori risk.  Therefore, for any positive result, the sensitivity, specificity, and a priori risk should be used to calculate the positive predictive value for that patient.  Because there are case reports for both false positives and false negatives, the test detects at least 20% fewer chromosome abnormalities then diagnostic testing (karyotype), it is not a replacement for amniocentesis or CVS at this time.  Counseling with the option of ultrasound and amniocentesis is still the standard of care following a positive screening test result.  Screening for neural tube defects via MSAFP is recommended for any patient pursuing NIPT.  RESULT: LOW FETAL FRACTION, INCREASE RISK FOR TRIPLOIDY, T18 or T13.    REDRAW RESULTS: PENDING    We discussed if the second draw should have a low fetal fraction there is a 3 to 4% risk of fetal aneuploidy.  This would be significantly higher than her maternal age risk.  We discussed if she should have a low risk result no further studies are indicated other than fetal ultrasound and growth ultrasounds.    AMNIOCENTESIS:  Prenatal procedures including amniocentesis so the only way to make a definitive prenatal diagnosis of a chromosome condition.  In addition to fetal karyotype, the option of chromosomal MicroArray was discussed to test for microdeletions and micro duplications.  Chromosomal MicroArray detects significant copy number variants and in additional 1.7% of pregnancies.  Amniocentesis is available after the 15th week of the pregnancy and has an associated risk of miscarriage of approximately 1 in 900 procedures with some studies suggesting a rate of 1 in 1600 procedures.  Other complications from the amniocentesis can include spotting,  cramping, leakage of fluid, and infection.  Amniocentesis has a 99.9% detection rate for chromosome abnormalities.  Amniocentesis can also detect 98 to 99% of spina bifida.    ASSESSMENT:  Low fetal fraction NIPT  Redraw is pending.    DECISION:  Accepted to wait for redraw results.   Please forward her results when available.  Recommend first trimester ultrasound and detailed ultrasound at 19-20 weeks.   Please advise if it is your desire to have these done here.    New outpt office visit with low complexity [facility] [id number]

## 2025-04-17 ENCOUNTER — HOSPITAL ENCOUNTER (EMERGENCY)
Facility: MEDICAL CENTER | Age: 30
End: 2025-04-17
Attending: EMERGENCY MEDICINE
Payer: COMMERCIAL

## 2025-04-17 ENCOUNTER — APPOINTMENT (OUTPATIENT)
Dept: RADIOLOGY | Facility: MEDICAL CENTER | Age: 30
End: 2025-04-17
Attending: EMERGENCY MEDICINE
Payer: COMMERCIAL

## 2025-04-17 VITALS
WEIGHT: 174.16 LBS | TEMPERATURE: 97 F | HEIGHT: 60 IN | RESPIRATION RATE: 16 BRPM | BODY MASS INDEX: 34.19 KG/M2 | OXYGEN SATURATION: 99 % | DIASTOLIC BLOOD PRESSURE: 71 MMHG | SYSTOLIC BLOOD PRESSURE: 101 MMHG | HEART RATE: 65 BPM

## 2025-04-17 DIAGNOSIS — Z3A.16 16 WEEKS GESTATION OF PREGNANCY: ICD-10-CM
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
## 2025-04-17 PROCEDURE — 76815 OB US LIMITED FETUS(S): CPT

## 2025-04-17 PROCEDURE — 99284 EMERGENCY DEPT VISIT MOD MDM: CPT

## 2025-04-17 NOTE — ED NOTES
Pt discharged to home. Pt was given follow up instructions. Pt verbalized understanding of all instructions for discharge and is ambulatory out of ED with steady gait. AOx4    
US at bedside  
IV discontinued, cath removed intact
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
## 2025-06-10 NOTE — Clinical Note
REFERRAL APPROVAL NOTICE         Sent on Tasha 10, 2025                   Slime Gonzalez  [address] [facility] [address] [place] [address]                   Dear Ms. Gonzalez,    After a careful review of the medical information and benefit coverage, [facility] has processed your referral. See below for additional details.    If applicable, you must be actively enrolled with your insurance for coverage of the authorized service. If you have any questions regarding your coverage, please contact your insurance directly.    REFERRAL INFORMATION   Referral #:  [id number]  Referred-To Department    Referred-By Provider:  Maternal Fetal Medicine    Gabriella Pinto D.O.   Maternal Fetal Med      [address] [facility]  [address]ND FLOOR [facility] [address]  [phone] [address], [address]  [place] [id number] [phone]    Referral Start Date:  06/10/2025  Referral End Date:   06/10/2026             SCHEDULING  If you do not already have an appointment, please call [phone] to make an appointment.     MORE INFORMATION  If you do not already have a [employer] account, sign up at: [email or web address]  You can access your medical information, make appointments, see lab results, billing information, and more.  If you have questions regarding this referral, please contact  the [facility] Referrals department at:             [phone]. Monday - Friday 8:00AM - 5:00PM.     Sincerely,    [facility]

## 2025-06-12 ENCOUNTER — ANCILLARY PROCEDURE (OUTPATIENT)
Dept: MATERNAL FETAL MEDICINE | Facility: MEDICAL CENTER | Age: 30
End: 2025-06-12
Payer: COMMERCIAL

## 2025-06-12 VITALS
SYSTOLIC BLOOD PRESSURE: 115 MMHG | HEART RATE: 67 BPM | WEIGHT: 184 LBS | DIASTOLIC BLOOD PRESSURE: 62 MMHG | BODY MASS INDEX: 35.94 KG/M2

## 2025-06-12 DIAGNOSIS — O28.3 ABNORMAL FETAL ULTRASOUND: ICD-10-CM

## 2025-06-12 PROCEDURE — 99213 OFFICE O/P EST LOW 20 MIN: CPT | Performed by: OBSTETRICS & GYNECOLOGY

## 2025-06-12 PROCEDURE — 76811 OB US DETAILED SNGL FETUS: CPT | Performed by: OBSTETRICS & GYNECOLOGY

## 2025-06-21 ENCOUNTER — OFFICE VISIT (OUTPATIENT)
Dept: URGENT CARE | Facility: PHYSICIAN GROUP | Age: 30
End: 2025-06-21
Payer: COMMERCIAL

## 2025-06-21 VITALS
DIASTOLIC BLOOD PRESSURE: 80 MMHG | SYSTOLIC BLOOD PRESSURE: 124 MMHG | HEART RATE: 70 BPM | BODY MASS INDEX: 36.36 KG/M2 | TEMPERATURE: 97.9 F | OXYGEN SATURATION: 98 % | RESPIRATION RATE: 12 BRPM | WEIGHT: 185.19 LBS | HEIGHT: 60 IN

## 2025-06-21 DIAGNOSIS — S05.01XA ABRASION OF RIGHT CORNEA, INITIAL ENCOUNTER: Primary | ICD-10-CM

## 2025-06-21 PROCEDURE — 3074F SYST BP LT 130 MM HG: CPT

## 2025-06-21 PROCEDURE — 3079F DIAST BP 80-89 MM HG: CPT

## 2025-06-21 PROCEDURE — 99214 OFFICE O/P EST MOD 30 MIN: CPT

## 2025-06-21 RX ORDER — ERYTHROMYCIN 5 MG/G
1 OINTMENT OPHTHALMIC 4 TIMES DAILY
Qty: 3.5 G | Refills: 0 | Status: SHIPPED | OUTPATIENT
Start: 2025-06-21 | End: 2025-07-01
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
## 2025-06-25 ASSESSMENT — ENCOUNTER SYMPTOMS
CHILLS: 0
SPUTUM PRODUCTION: 0
MYALGIAS: 0
PHOTOPHOBIA: 0
DIZZINESS: 0
WHEEZING: 0
EYE PAIN: 1
ABDOMINAL PAIN: 0
SHORTNESS OF BREATH: 0
STRIDOR: 0
NAUSEA: 0
EYE DISCHARGE: 1
DOUBLE VISION: 0
DIARRHEA: 0
SORE THROAT: 0
EYE REDNESS: 1
COUGH: 0
PALPITATIONS: 0
FEVER: 0
VOMITING: 0
HEADACHES: 0
BLURRED VISION: 0

## 2025-07-21 ENCOUNTER — ANCILLARY PROCEDURE (OUTPATIENT)
Dept: MATERNAL FETAL MEDICINE | Facility: MEDICAL CENTER | Age: 30
End: 2025-07-21
Payer: COMMERCIAL

## 2025-07-21 VITALS
DIASTOLIC BLOOD PRESSURE: 58 MMHG | WEIGHT: 192 LBS | HEART RATE: 70 BPM | BODY MASS INDEX: 37.5 KG/M2 | SYSTOLIC BLOOD PRESSURE: 109 MMHG

## 2025-07-21 DIAGNOSIS — O28.3 ABNORMAL FETAL ULTRASOUND: ICD-10-CM

## 2025-07-21 PROCEDURE — 76816 OB US FOLLOW-UP PER FETUS: CPT | Performed by: OBSTETRICS & GYNECOLOGY
